# Patient Record
Sex: FEMALE | Race: WHITE | Employment: FULL TIME | URBAN - METROPOLITAN AREA
[De-identification: names, ages, dates, MRNs, and addresses within clinical notes are randomized per-mention and may not be internally consistent; named-entity substitution may affect disease eponyms.]

---

## 2018-06-07 ENCOUNTER — OFFICE VISIT (OUTPATIENT)
Dept: FAMILY MEDICINE CLINIC | Age: 28
End: 2018-06-07

## 2018-06-07 VITALS
BODY MASS INDEX: 27.78 KG/M2 | SYSTOLIC BLOOD PRESSURE: 142 MMHG | DIASTOLIC BLOOD PRESSURE: 98 MMHG | HEIGHT: 67 IN | OXYGEN SATURATION: 99 % | TEMPERATURE: 98 F | RESPIRATION RATE: 18 BRPM | WEIGHT: 177 LBS | HEART RATE: 84 BPM

## 2018-06-07 DIAGNOSIS — R07.89 ATYPICAL CHEST PAIN: Primary | ICD-10-CM

## 2018-06-07 DIAGNOSIS — F41.8 SITUATIONAL ANXIETY: ICD-10-CM

## 2018-06-07 DIAGNOSIS — R03.0 ELEVATED BLOOD PRESSURE READING IN OFFICE WITHOUT DIAGNOSIS OF HYPERTENSION: ICD-10-CM

## 2018-06-07 RX ORDER — DIAZEPAM 5 MG/1
5 TABLET ORAL
Qty: 30 TAB | Refills: 0 | Status: SHIPPED | OUTPATIENT
Start: 2018-06-07 | End: 2021-03-09

## 2018-06-07 NOTE — MR AVS SNAPSHOT
[address] [address] 
[phone] Patient: Bibi Morley MRN: [id number] Visit Information Date & Time Provider Department Dept. Phone Encounter #  
 6/7/2018  1:30 PM [facility] [phone] [id number] Follow-up Instructions Return in about 2 weeks (around 6/21/2018) for follow up. Upcoming Health Maintenance Date Due DTaP/Tdap/Td series (1 - Tdap) 11/5/2011 PAP AKA CERVICAL CYTOLOGY 11/5/2011 Influenza Age 5 to Adult 8/1/2018 Allergies as of 6/7/2018  Review Complete On: 6/7/2018 By: Suhas Card NP Severity Noted Reaction Type Reactions Imitrex [Sumatriptan Succinate]  06/07/2018    Palpitations Current Immunizations  Never Reviewed No immunizations on file. Not reviewed this visit You Were Diagnosed With   
  
 Codes Comments Atypical chest pain    -  Primary ICD-10-CM: R07.89 ICD-9-CM: 786.59 Situational anxiety     ICD-10-CM: F41.8 ICD-9-CM: 300.09 Elevated blood pressure reading in office without diagnosis of hypertension     ICD-10-CM: R03.0 ICD-9-CM: 796.2 Vitals BP Pulse Temp Resp Height(growth percentile) Weight(growth percentile) (!) 142/98 (BP 1 Location: Right arm, BP Patient Position: Sitting) 84 98 °F (36.7 °C) (Oral) 18 5' 7\" (1.702 m) 177 lb (80.3 kg) LMP SpO2 BMI OB Status Smoking Status 05/23/2018 99% 27.72 kg/m2 Having regular periods Current Every Day Smoker Vitals History BMI and BSA Data Body Mass Index Body Surface Area  
 27.72 kg/m 2 1.95 m 2 Preferred Pharmacy Pharmacy Name Phone [facility] [place], [address] [place] [facility] [phone] Your Updated Medication List  
  
   
This list is accurate as of 6/7/18  2:10 PM.  Always use your most recent med list.  
  
  
  
  
 diazePAM 5 mg tablet Commonly known as:  VALIUM Take 1 Tab by mouth daily as needed for Anxiety. Prescriptions Printed Refills  
 diazePAM (VALIUM) 5 mg tablet 0 Sig: Take 1 Tab by mouth daily as needed for Anxiety. Class: Print Route: Oral  
  
Follow-up Instructions Return in about 2 weeks (around 6/21/2018) for follow up. Patient Instructions Benzodiazepines: Potential side effects of benzodiazepine medications include, but are not limited to, the possibility of \"paradoxical agitation\" with irritability, aggressiveness or stimulated behavior; clumsiness, slurring of speech, dulled facies, psychomotor impairment, anterograde amnesia, impaired awareness of degree of drug effect, visual and hearing sensitivity impairment, other psychiatric/behavioral disturbances, impacts operating certain machinery or engaging in certain activities or employment, anxiety, insomnia, anorexia, tremor, nausea, vomiting, diarrhea and potential to develop tolerance, dependence, addiction and death from overdose. Use caution while taking benzodiazepines. There is a potential to overdose on this medication when too many pills are taken at one time. Do not mix alcohol with this medication because it can worsen side effects and be very dangerous. Introducing [facility] & HEALTH SERVICES! [facility] introduces AboutOne patient portal. Now you can access parts of your medical record, email your doctor's office, and request medication refills online. 1. In your internet browser, go to [email or web address] 2. Click on the First Time User? Click Here link in the Sign In box. You will see the New Member Sign Up page. 3. Enter your AboutOne Access Code exactly as it appears below. You will not need to use this code after youve completed the sign-up process. If you do not sign up before the expiration date, you must request a new code. · MedArkive Access Code: [email or web address] Expires: 9/5/2018  2:10 PM 
 
4. Enter the last four digits of your Social Security Number (xxxx) and Date of Birth (mm/dd/yyyy) as indicated and click Submit. You will be taken to the next sign-up page. 5. Create a MedArkive ID. This will be your MedArkive login ID and cannot be changed, so think of one that is secure and easy to remember. 6. Create a MedArkive password. You can change your password at any time. 7. Enter your Password Reset Question and Answer. This can be used at a later time if you forget your password. 8. Enter your e-mail address. You will receive e-mail notification when new information is available in [address] 9. Click Sign Up. You can now view and download portions of your medical record. 10. Click the Download Summary menu link to download a portable copy of your medical information. If you have questions, please visit the Frequently Asked Questions section of the MedArkive website. Remember, MedArkive is NOT to be used for urgent needs. For medical emergencies, dial 911. Now available from your iPhone and Android! Please provide this summary of care documentation to your next provider. If you have any questions after today's visit, please call [phone].

## 2018-06-07 NOTE — PROGRESS NOTES
Chief Complaint   Patient presents with   [address] Chest Pain     Patient in office today to [facility]. Pt in office today for f/u on chest pain that was severe on Sunday. [facility], and ekg were normal.  Pt did f/u with pcp  @ [facility]. Pt was prescribed diclofenac, did not notice an improvement with chest pain. Pt states pain is sporadic, located in left breast can radiates to left arm. Denies injury. Have treated pain with ibuprofen, tylenol, zantac, and tums with no relief. Chest pain comes and goes for the last few years. Usually only lasts for about 30 mins to an hour. This weekend it was more severe so she went to ED. Was \"20 times worse. \" Radiated to the left shoulder and arm. Has had chest xray no chest CT. Chest pain comes and goes, unpredictable. Prior to this weekend had not had pain for a few months. Used to think it was muscular. Noticed more when she washed her hair while showering but does not seem to trigger as often. Denies any associated SOB and dyspnea with CP. Will hold breath if pain is severe. Denies any RUQ abd pain. Reports increased anxiety. Increased work stress on a day to day basis. Pt works in manufacturing. Manages a department. Thinks that she notices the pain when she is more stress. Had a stressful weekend. Was sitting at dinner when the pain started this weekend. Started slow but became extreme in a short amount of time. Pain can be worse with certain movements. Has to sit still to get comfortable. Denies any family history of lung or heart conditions. Had a problem with asthma as a child. Pt smokes approx 2-3 packs a week. Stress smoker. Reports that she is scared to shower due to sx and feeling it could be making things worse. Recalls trying a muscle relaxer which helped. This was a long time ago. BP was 148/90 in the ER. A little elevated today.      Denies any other concerns at this time. Chief Complaint   Patient presents with   [facility].    Chest Pain     she is a 32y.o. year old female who presents for evalution. Reviewed PmHx, RxHx, FmHx, SocHx, AllgHx and updated and dated in the chart. Review of Systems - negative except as listed above in the HPI    Objective:     Vitals:    06/07/18 1336   BP: (!) 142/98   Pulse: 84   Resp: 18   Temp: 98 °F (36.7 °C)   TempSrc: Oral   SpO2: 99%   Weight: 177 lb (80.3 kg)   Height: 5' 7\" (1.702 m)     Physical Examination: General appearance - alert, well appearing, and in no distress  Mental status - normal mood, behavior, speech, dress, motor activity, and thought processes  Eyes - pupils equal and reactive, extraocular eye movements intact  Ears - bilateral TM's and external ear canals normal  Nose - normal and patent, no erythema, discharge or polyps and normal nontender sinuses  Mouth - mucous membranes moist, pharynx normal without lesions  Neck - supple, no significant adenopathy, carotids upstroke normal bilaterally, no bruits, thyroid exam: thyroid is normal in size without nodules or tenderness  Chest - clear to auscultation, no wheezes, rales or rhonchi, symmetric air entry  Heart - normal rate, regular rhythm, normal S1, S2, no murmurs  Extremities - peripheral pulses normal, no ankle edema, no clubbing or cyanosis  Skin - normal coloration and turgor, no rashes, no suspicious skin lesions noted    Assessment/ Plan:   Diagnoses and all orders for this visit:    1. Atypical chest pain / 2. Situational anxiety  -     diazePAM (VALIUM) 5 mg tablet; Take 1 Tab by mouth daily as needed for Anxiety. Discussed that sx and presentation seem consistent with anxiety. Recommended trial of valium daily as needed. Reviewed SEs/ADRs of medication and how to take responsibly. Enc pt to keep a diary of sx and what information to include. Follow up in 2 weeks to review together.    3. Elevated blood pressure reading in office without diagnosis of hypertension  Follow up in 2 weeks to recheck. Discussed that if BP remains elevated, will need to start new daily medication. Will request and review records from [facility]. Follow-up Disposition:  Return in about 2 weeks (around 6/21/2018) for follow up. I have discussed the diagnosis with the patient and the intended plan as seen in the above orders. The patient has received an after-visit summary and questions were answered concerning future plans. Medication Side Effects and Warnings were discussed with patient: yes  Patient Labs were reviewed and or requested: yes  Patient Past Records were reviewed and or requested  yes  Patient / Caregiver Understanding of treatment plan was verbalized during office visit YES    HAYDEN Paul    Patient Instructions   Benzodiazepines: Potential side effects of benzodiazepine medications include, but are not limited to, the possibility of \"paradoxical agitation\" with irritability, aggressiveness or stimulated behavior; clumsiness, slurring of speech, dulled facies, psychomotor impairment, anterograde amnesia, impaired awareness of degree of drug effect, visual and hearing sensitivity impairment, other psychiatric/behavioral disturbances, impacts operating certain machinery or engaging in certain activities or employment, anxiety, insomnia, anorexia, tremor, nausea, vomiting, diarrhea and potential to develop tolerance, dependence, addiction and death from overdose. Use caution while taking benzodiazepines. There is a potential to overdose on this medication when too many pills are taken at one time. Do not mix alcohol with this medication because it can worsen side effects and be very dangerous.

## 2018-06-07 NOTE — PROGRESS NOTES
Chief Complaint   Patient presents with   [address] Chest Pain     Patient in office today to est care. Pt in office today for f/u on chest pain that was severe on Sunday. Pt was see at [facility], labs, xray, and ekg was normal.  Pt did f/u with pcp  @ [facility]. Pt was prescribed diclofenac, did not notice an improvement with chest pain. Pt states pain is sporadic, located in left breast can radiates to left arm. Denies injury. Have treated pain with ibuprofen,tylenol,zantac, and tums with no relief. 1. Have you been to the ER, urgent care clinic since your last visit? Hospitalized since your last visit? No    2. Have you seen or consulted any other health care providers outside of the [address] [place] [address] since your last visit? Include any pap smears or colon screening.  No

## 2018-06-21 ENCOUNTER — OFFICE VISIT (OUTPATIENT)
Dept: FAMILY MEDICINE CLINIC | Age: 28
End: 2018-06-21

## 2018-06-21 VITALS
RESPIRATION RATE: 17 BRPM | OXYGEN SATURATION: 98 % | DIASTOLIC BLOOD PRESSURE: 91 MMHG | BODY MASS INDEX: 28.09 KG/M2 | SYSTOLIC BLOOD PRESSURE: 135 MMHG | WEIGHT: 179 LBS | HEIGHT: 67 IN | TEMPERATURE: 98.2 F | HEART RATE: 82 BPM

## 2018-06-21 DIAGNOSIS — R03.0 ELEVATED BLOOD PRESSURE READING: ICD-10-CM

## 2018-06-21 DIAGNOSIS — R07.9 CHEST PAIN, UNSPECIFIED TYPE: Primary | ICD-10-CM

## 2018-06-21 DIAGNOSIS — R20.2 PARESTHESIA OF LEFT ARM: ICD-10-CM

## 2018-06-21 RX ORDER — DICLOFENAC SODIUM 75 MG/1
75 TABLET, DELAYED RELEASE ORAL
Qty: 30 TAB | Refills: 0 | Status: SHIPPED | OUTPATIENT
Start: 2018-06-21 | End: 2021-03-09

## 2018-06-21 NOTE — MR AVS SNAPSHOT
[address] [address] 
[phone] Patient: India Judge MRN: [id number] Visit Information Date & Time Provider Department Dept. Phone Encounter #  
 6/21/2018  3:30 PM Sarah Lawrence NP [address] [facility] [phone] [id number] Follow-up Instructions Return if symptoms worsen or fail to improve. Upcoming Health Maintenance Date Due Pneumococcal 19-64 Medium Risk (1 of 1 - PPSV23) 11/5/2009 DTaP/Tdap/Td series (1 - Tdap) 11/5/2011 PAP AKA CERVICAL CYTOLOGY 11/5/2011 Influenza Age 5 to Adult 8/1/2018 Allergies as of 6/21/2018  Review Complete On: 6/21/2018 By: Susan Whalen LPN Severity Noted Reaction Type Reactions Imitrex [Sumatriptan Succinate]  06/07/2018    Palpitations Imitrex [Sumatriptan]  01/22/2011    Palpitations Current Immunizations  Never Reviewed No immunizations on file. Not reviewed this visit You Were Diagnosed With   
  
 Codes Comments Chest pain, unspecified type    -  Primary ICD-10-CM: R07.9 ICD-9-CM: 786.50 Paresthesia of left arm     ICD-10-CM: R20.2 ICD-9-CM: 782.0 Elevated blood pressure reading     ICD-10-CM: R03.0 ICD-9-CM: 796.2 Vitals BP Pulse Temp Resp Height(growth percentile) Weight(growth percentile) (!) 135/91 (BP 1 Location: Left arm, BP Patient Position: Sitting) 82 98.2 °F (36.8 °C) (Oral) 17 5' 7\" (1.702 m) 179 lb (81.2 kg) LMP SpO2 BMI OB Status Smoking Status 05/23/2018 98% 28.04 kg/m2 Having regular periods Current Every Day Smoker Vitals History BMI and BSA Data Body Mass Index Body Surface Area 28.04 kg/m 2 1.96 m 2 Preferred Pharmacy Pharmacy Name Phone [facility] [place], [address] [place] AT [facility] [phone] Your Updated Medication List  
  
   
 This list is accurate as of 6/21/18  3:47 PM.  Always use your most recent med list.  
  
  
  
  
 diazePAM 5 mg tablet Commonly known as:  VALIUM Take 1 Tab by mouth daily as needed for Anxiety. diclofenac EC 75 mg EC tablet Commonly known as:  VOLTAREN Take 1 Tab by mouth two (2) times daily (after meals). VICODIN 5-500 mg per tablet Generic drug:  HYDROcodone-acetaminophen Take 1 Tab by mouth every four (4) hours as needed. Prescriptions Sent to Pharmacy Refills  
 diclofenac EC (VOLTAREN) 75 mg EC tablet 0 Sig: Take 1 Tab by mouth two (2) times daily (after meals). Class: Normal  
 Pharmacy: [facility] [address] [facility] #: [phone] Route: Oral  
  
We Performed the Following REFERRAL TO CARDIOLOGY [VXV56 Custom] Follow-up Instructions Return if symptoms worsen or fail to improve. Referral Information Referral ID Referred By Referred To  
  
 [id number] Zain ARCE MD   
   [address] [place] [address] Phone: [phone] Fax: [phone] Visits Status Start Date End Date 1 New Request 6/21/18 6/21/19 If your referral has a status of pending review or denied, additional information will be sent to support the outcome of this decision. Patient Instructions Chest Pain: Care Instructions Your Care Instructions There are many things that can cause chest pain. Some are not serious and will get better on their own in a few days. But some kinds of chest pain need more testing and treatment. Your doctor may have recommended a follow-up visit in the next 8 to 12 hours. If you are not getting better, you may need more tests or treatment. Even though your doctor has released you, you still need to watch for any problems.  The doctor carefully checked you, but sometimes problems can develop later. If you have new symptoms or if your symptoms do not get better, get medical care right away. If you have worse or different chest pain or pressure that lasts more than 5 minutes or you passed out (lost consciousness), call 911 or seek other emergency help right away. A medical visit is only one step in your treatment. Even if you feel better, you still need to do what your doctor recommends, such as going to all suggested follow-up appointments and taking medicines exactly as directed. This will help you recover and help prevent future problems. How can you care for yourself at home? · Rest until you feel better. · Take your medicine exactly as prescribed. Call your doctor if you think you are having a problem with your medicine. · Do not drive after taking a prescription pain medicine. When should you call for help? Call 911 if: 
? · You passed out (lost consciousness). ? · You have severe difficulty breathing. ? · You have symptoms of a heart attack. These may include: ¨ Chest pain or pressure, or a strange feeling in your chest. 
¨ Sweating. ¨ Shortness of breath. ¨ Nausea or vomiting. ¨ Pain, pressure, or a strange feeling in your back, neck, jaw, or upper belly or in one or both shoulders or arms. ¨ Lightheadedness or sudden weakness. ¨ A fast or irregular heartbeat. After you call 911, the  may tell you to chew 1 adult-strength or 2 to 4 low-dose aspirin. Wait for an ambulance. Do not try to drive yourself. ?Call your doctor today if: 
? · You have any trouble breathing. ? · Your chest pain gets worse. ? · You are dizzy or lightheaded, or you feel like you may faint. ? · You are not getting better as expected. ? · You are having new or different chest pain. Where can you learn more? Go to http://johan-justine.info/. Enter A120 in the search box to learn more about \"Chest Pain: Care Instructions. \" Current as of: March 20, 2017 Content Version: 11.4 © 1113-5291 Healthwise, [employer]. Care instructions adapted under license by [employer] (which disclaims liability or warranty for this information). If you have questions about a medical condition or this instruction, always ask your healthcare professional. [address] any warranty or liability for your use of this information. Introducing hospitals & HEALTH SERVICES! [facility] introduces NeuroLogica patient portal. Now you can access parts of your medical record, email your doctor's office, and request medication refills online. 1. In your internet browser, go to [email or web address] 2. Click on the First Time User? Click Here link in the Sign In box. You will see the New Member Sign Up page. 3. Enter your NeuroLogica Access Code exactly as it appears below. You will not need to use this code after youve completed the sign-up process. If you do not sign up before the expiration date, you must request a new code. · NeuroLogica Access Code: [email or web address] Expires: 9/5/2018  2:10 PM 
 
4. Enter the last four digits of your Social Security Number (xxxx) and Date of Birth (mm/dd/yyyy) as indicated and click Submit. You will be taken to the next sign-up page. 5. Create a NeuroLogica ID. This will be your NeuroLogica login ID and cannot be changed, so think of one that is secure and easy to remember. 6. Create a NeuroLogica password. You can change your password at any time. 7. Enter your Password Reset Question and Answer. This can be used at a later time if you forget your password. 8. Enter your e-mail address. You will receive e-mail notification when new information is available in [address] 9. Click Sign Up. You can now view and download portions of your medical record. 10. Click the Download Summary menu link to download a portable copy of your medical information. If you have questions, please visit the Frequently Asked Questions section of the [employer] website. Remember, GetOutfitted is NOT to be used for urgent needs. For medical emergencies, dial 911. Now available from your iPhone and Android! Please provide this summary of care documentation to your next provider. Your primary care clinician is listed as Alia Lawler. If you have any questions after today's visit, please call [phone].

## 2018-06-21 NOTE — PATIENT INSTRUCTIONS
Chest Pain: Care Instructions  Your Care Instructions    There are many things that can cause chest pain. Some are not serious and will get better on their own in a few days. But some kinds of chest pain need more testing and treatment. Your doctor may have recommended a follow-up visit in the next 8 to 12 hours. If you are not getting better, you may need more tests or treatment. Even though your doctor has released you, you still need to watch for any problems. The doctor carefully checked you, but sometimes problems can develop later. If you have new symptoms or if your symptoms do not get better, get medical care right away. If you have worse or different chest pain or pressure that lasts more than 5 minutes or you passed out (lost consciousness), call 911 or seek other emergency help right away. A medical visit is only one step in your treatment. Even if you feel better, you still need to do what your doctor recommends, such as going to all suggested follow-up appointments and taking medicines exactly as directed. This will help you recover and help prevent future problems. How can you care for yourself at home? · Rest until you feel better. · Take your medicine exactly as prescribed. Call your doctor if you think you are having a problem with your medicine. · Do not drive after taking a prescription pain medicine. When should you call for help? Call 911 if:  ? · You passed out (lost consciousness). ? · You have severe difficulty breathing. ? · You have symptoms of a heart attack. These may include:  ¨ Chest pain or pressure, or a strange feeling in your chest.  ¨ Sweating. ¨ Shortness of breath. ¨ Nausea or vomiting. ¨ Pain, pressure, or a strange feeling in your back, neck, jaw, or upper belly or in one or both shoulders or arms. ¨ Lightheadedness or sudden weakness. ¨ A fast or irregular heartbeat.   After you call 911, the  may tell you to chew 1 adult-strength or 2 to 4 low-dose aspirin. Wait for an ambulance. Do not try to drive yourself. ?Call your doctor today if:  ? · You have any trouble breathing. ? · Your chest pain gets worse. ? · You are dizzy or lightheaded, or you feel like you may faint. ? · You are not getting better as expected. ? · You are having new or different chest pain. Where can you learn more? Go to http://johan-justine.info/. Enter A120 in the search box to learn more about \"Chest Pain: Care Instructions. \"  Current as of: March 20, 2017  Content Version: 11.4  © 5999-1488 [employer]. Care instructions adapted under license by [employer] (which disclaims liability or warranty for this information). If you have questions about a medical condition or this instruction, always ask your healthcare professional. [address] any warranty or liability for your use of this information.

## 2018-06-21 NOTE — PROGRESS NOTES
Chief Complaint   Patient presents with    Follow-up     Last OV on 6/7/19 for chest pain    Arm Pain     Left    Numbness     Left     she is a 32y.o. year old female who presents for evalution. Pt here for chest pain F/U. Has been seeing Melissa Carlin and was given Valium at last appointment. Pt has taken Valium when had minor episodes of chest pain - helped with severity and length of symptoms. Pt has not had major chest pain event yet. Pt states has been having these chest pain for past few years, is interested in seeing Cardio if possible. Pt states when went to ER was having left arm pain. At first thought was just from IV and other things. Since then has been continuously aching. Went to [employer] over weekend and worsened and now started having numbness. Reviewed PmHx, RxHx, FmHx, SocHx, AllgHx and updated and dated in the chart. Review of Systems - negative except as listed above in the HPI    Objective:     Vitals:    06/21/18 1528   BP: (!) 135/91   Pulse: 82   Resp: 17   Temp: 98.2 °F (36.8 °C)   TempSrc: Oral   SpO2: 98%   Weight: 179 lb (81.2 kg)   Height: 5' 7\" (1.702 m)     Physical Examination: General appearance - alert, well appearing, and in no distress  Chest - clear to auscultation, no wheezes, rales or rhonchi, symmetric air entry  Heart - normal rate, regular rhythm, normal S1, S2, no murmurs, rubs, clicks or gallops  Musculoskeletal - abnormal exam of left shoulder  Mild crepitus, movements painful, no tenderness, swelling or deformity     Assessment/ Plan:   Diagnoses and all orders for this visit:    1. Chest pain, unspecified type  -     [facility]  Likely secondary to anxiety but pt would like to see specialist since has been on-going for past 2 years. 2. Paresthesia of left arm  -     diclofenac EC (VOLTAREN) 75 mg EC tablet; Take 1 Tab by mouth two (2) times daily (after meals). New rx. Activity modification, gentle stretching.      3. Elevated blood pressure reading  Pt will obtain monitor and start checking at home when wakes up. F/U if readings persistently >130/90. Pt voiced understanding regarding plan of care. Follow-up Disposition:  Return if symptoms worsen or fail to improve. I have discussed the diagnosis with the patient and the intended plan as seen in the above orders. The patient has received an after-visit summary and questions were answered concerning future plans.      Medication Side Effects and Warnings were discussed with patient    Preet Musa NP

## 2018-07-09 ENCOUNTER — TELEPHONE (OUTPATIENT)
Dept: FAMILY MEDICINE CLINIC | Age: 28
End: 2018-07-09

## 2018-07-09 RX ORDER — PROPRANOLOL HYDROCHLORIDE 10 MG/1
10 TABLET ORAL
Qty: 90 TAB | Refills: 0 | Status: SHIPPED | OUTPATIENT
Start: 2018-07-09 | End: 2021-03-09

## 2018-07-09 NOTE — TELEPHONE ENCOUNTER
Pt calling and wants to speak with nurse about pains in chest that are back. Please call her back at [phone].

## 2018-07-09 NOTE — TELEPHONE ENCOUNTER
Eliceo called. Pt was still having chest pain even after taking Voltaren. Call transferred to [place].

## 2018-07-09 NOTE — TELEPHONE ENCOUNTER
Spoke with pt on phone. Actively having dizziness and chest pain starting around noon. Took Valium and pain eased up, however pain still present and then started worsening again but in different way. Also having racing heart - around 120. Pt states when tries to stand up or move pain in chest will worsen - feel like getting stabbed in back and be unable to move. Recommended pt restart Diclofenac for a few days to help with stabbing sensation. Will send in rx to help with heart rate control. No new or concerning red flag symptoms so at this time do not feel like pt needs to go back to ER - just had cardiac work-up done less than a month ago. However, if symptoms change and pt feels in danger then recommended going to ER. Pt voiced understanding. States has appt with Cardio in 1 wk.

## 2018-07-11 RX ORDER — PHENOL/SODIUM PHENOLATE
20 AEROSOL, SPRAY (ML) MUCOUS MEMBRANE DAILY
Qty: 30 TAB | Refills: 0 | Status: SHIPPED | OUTPATIENT
Start: 2018-07-11 | End: 2018-08-06 | Stop reason: SDUPTHER

## 2018-07-11 RX ORDER — PREDNISONE 20 MG/1
20 TABLET ORAL 3 TIMES DAILY
Qty: 15 TAB | Refills: 0 | Status: SHIPPED | OUTPATIENT
Start: 2018-07-11 | End: 2018-07-16

## 2018-07-11 RX ORDER — CYCLOBENZAPRINE HCL 10 MG
10 TABLET ORAL
Qty: 30 TAB | Refills: 0 | Status: SHIPPED | OUTPATIENT
Start: 2018-07-11 | End: 2021-03-09

## 2018-07-11 NOTE — TELEPHONE ENCOUNTER
Called pt, ID x2. Pt stated that her chest pain is worst and her HR is in the 90s. Please call her back.

## 2018-07-11 NOTE — TELEPHONE ENCOUNTER
Upon further speaking with pt pain happened after meal last night of lasanga - could be GERD related. Will also send in PPI.

## 2018-07-11 NOTE — TELEPHONE ENCOUNTER
Pt states since last night and today has had pain in chest radiating into neck, unable to sleep. States Diclofenac is not helping at all, is taking propranolol which is helping with rate control. Pt states pain is severe, hard to move or go to work. Pt does have Cardio appt on Monday with Dr. Emiliana Sotomayor. Discussed with patient could be musculoskeletal.  Will send in rx for Prednisone and muscle relaxer.

## 2018-07-11 NOTE — TELEPHONE ENCOUNTER
Pt calling and states was told by Sai Gonzales the other day if after taking the medication that was called in and no relief of symptoms to call office back. Please call her today asap at [phone].

## 2018-07-11 NOTE — TELEPHONE ENCOUNTER
Unsure what else we can give to pt to help with symptoms at this time. Recommend continuing to take Diclofenac BID after meals and propranolol TID. What is heart rate running? Should be improved from 120 on Propranolol.    Thanks,  N

## 2018-07-16 ENCOUNTER — OFFICE VISIT (OUTPATIENT)
Dept: CARDIOLOGY CLINIC | Age: 28
End: 2018-07-16

## 2018-07-16 VITALS
OXYGEN SATURATION: 98 % | RESPIRATION RATE: 18 BRPM | HEIGHT: 67 IN | SYSTOLIC BLOOD PRESSURE: 140 MMHG | DIASTOLIC BLOOD PRESSURE: 90 MMHG | HEART RATE: 93 BPM | WEIGHT: 176.6 LBS | BODY MASS INDEX: 27.72 KG/M2

## 2018-07-16 DIAGNOSIS — R07.9 CHEST PAIN, UNSPECIFIED TYPE: Primary | ICD-10-CM

## 2018-07-16 DIAGNOSIS — I10 HYPERTENSION, UNSPECIFIED TYPE: ICD-10-CM

## 2018-07-16 DIAGNOSIS — I20.0 UNSTABLE ANGINA PECTORIS (HCC): ICD-10-CM

## 2018-07-16 NOTE — PROGRESS NOTES
Angelito Stubbs MD. [facility] - Echo Patient: Claudeen Kanner : 1990 Today's Date: 2018 HISTORY OF PRESENT ILLNESS:  
 
History of Present Illness: Ms. Ki Washburn has had chronic chest pain (a couple of years) and wanted to see cardiology to rule out heart disease. She feels symptoms getting worse. Was in ER last month and workup there was OK ([facility]). She has pain usually when she is relaxed - it is sharp - worse leaning forward or moving to the left. More frequent recently. Pain is not exertional.  Pain can last from one hour to an entire day. PAST MEDICAL HISTORY:  
 
Past Medical History:  
Diagnosis Date  Asthma   
 Heart murmur  Raynaud disease  UTI (lower urinary tract infection) Past Surgical History:  
Procedure Laterality Date  HX ORTHOPAEDIC    
 realignment on left knee  HX WISDOM TEETH EXTRACTION    
 REVISION OF UNSTABLE PATELLA RIGHT  
 
 
 
 
MEDICATIONS:  
 
Current Outpatient Prescriptions Medication Sig Dispense Refill  predniSONE (DELTASONE) 20 mg tablet Take 1 Tab by mouth three (3) times daily for 5 days. 15 Tab 0  cyclobenzaprine (FLEXERIL) 10 mg tablet Take 1 Tab by mouth three (3) times daily as needed for Muscle Spasm(s). 30 Tab 0  
 Omeprazole delayed release (PRILOSEC D/R) 20 mg tablet Take 1 Tab by mouth daily. Take in AM with water, wait 30-60 min before eating or drinking 30 Tab 0  propranolol (INDERAL) 10 mg tablet Take 1 Tab by mouth three (3) times daily as needed. 90 Tab 0  
 diclofenac EC (VOLTAREN) 75 mg EC tablet Take 1 Tab by mouth two (2) times daily (after meals). 30 Tab 0  
 diazePAM (VALIUM) 5 mg tablet Take 1 Tab by mouth daily as needed for Anxiety. 30 Tab 0  
 hydrocodone-acetaminophen (VICODIN) 5-500 mg per tablet Take 1 Tab by mouth every four (4) hours as needed. Allergies Allergen Reactions  Imitrex [Sumatriptan Succinate] Palpitations  Imitrex [Sumatriptan] Palpitations SOCIAL HISTORY:  
 
Social History Substance Use Topics  Smoking status: Current Every Day Smoker Packs/day: 0.25  Smokeless tobacco: Never Used  Alcohol use 3.0 oz/week  
  5 Glasses of wine per week FAMILY HISTORY:  
 
Family History Problem Relation Age of Onset  No Known Problems Mother  No Known Problems Father REVIEW OF SYMPTOMS:  
 
Review of Symptoms: 
Constitutional: Negative for fever, chills HEENT: Negative for nosebleeds, tinnitus, and vision changes. Respiratory: Negative for cough, wheezing Cardiovascular: Negative for orthopnea, claudication, syncope, and PND. Gastrointestinal: Negative for abdominal pain, diarrhea, melena. Genitourinary: Negative for dysuria Musculoskeletal: Negative for myalgias. Skin: Negative for rash Heme: No problems bleeding. Neurological: Negative for speech change and focal weakness. PHYSICAL EXAM:  
 
Physical Exam: 
Visit Vitals  /90 (BP 1 Location: Left arm, BP Patient Position: Sitting)  Pulse 93  Resp 18  Ht 5' 7\" (1.702 m)  Wt 176 lb 9.6 oz (80.1 kg)  SpO2 98%  BMI 27.66 kg/m2 Patient appears generally well, mood and affect are appropriate and pleasant. HEENT:  Hearing intact, non-icteric, normocephalic, atraumatic. Neck Exam: Supple, No JVD or carotid bruits. Lung Exam: Clear to auscultation, even breath sounds. Cardiac Exam: Regular rate and rhythm with no murmur Abdomen: Soft, non-tender, normal bowel sounds. No bruits or masses. Extremities: Moves all ext well. No lower extremity edema. Vascular: 2+ dorsalis pedis pulses bilaterally. Psych: Appropriate affect Neuro - Grossly intact LABS / OTHER STUDIES:  
 
 
Have requested [facility] records to review. CARDIAC DIAGNOSTICS:  
 
Cardiac Evaluation Includes: 
EKG 7/16/18 - NSR, normal  
 
 
 
 
ASSESSMENT AND PLAN:  
 
Assessment and Plan: 
1) Non-anginal chest pain - symptoms have been non-anginal.   
- Symptoms sound like msktl in nature. - In order to rule out heart disease, will check a treadmill stress test and an echo - Will get recent records from [address] [place] [address] to review. - If cardiac workup is OK, hopefully this will provide some reassurance. Would focus on chest wall pain and anxiety to help improve symptoms. 2) Smoking cessation advised. Patient says her prior lipid levels have looked good. 3) BP high today. Needs to follow healthy lifestyle and follow as outpatient. 4) Phone FU after testing. See me back as needed. Patient expressed understanding of the plan - questions were answered. Engaged to be  in November 2018. Priyanka Ramos MD, [facility] [place] [facility] [address] 566 [facility], [address]  [facility] [address] Ph: [phone]   Ph [phone] ADDENDUM   7/25/2018 Echo 7/25/18 - LVEF 65% Treadmill Stress test 7/25/18 - walked 9 min (10.1 METS), normal study BP was OK with the stress test.  
 
Will have nurse call with normal stress test and echo results. CP likely non-cardiac. See me back as needed.

## 2018-07-16 NOTE — PROGRESS NOTES
All health maintenance and other pertinent information has been reviewed in preparation for today's office visit. Patient presents in the office today for:    Chief Complaint   Patient presents with    New Patient     Pt c/o sharp pain in the middle of chest with pain radiating down left arm. States it last on and off for the past month. Pt states she also gets lightheaded and dizzy when the chest pains occur     1. Have you been to the ER, urgent care clinic since your last visit? Hospitalized since your last visit? No    2. Have you seen or consulted any other health care providers outside of the [address] [place] [address] since your last visit? Include any pap smears or colon screening.  No

## 2018-07-16 NOTE — MR AVS SNAPSHOT
[address] [address] 
[phone] Patient: Tamera Shannon MRN: [id number] [id number] Visit Information Date & Time Provider Department Dept. Phone Encounter #  
 7/16/2018  1:40 PM Sofia Betancur MD [facility] [phone] [id number] Your Appointments 7/25/2018  9:00 AM  
ECHO CARDIOGRAMS 2D with ECHO, Tanner Lopez [facility] ([facility] SCHEDULING) Appt Note: per dr Lizzie watson cp reg stress test at [address] [facility] [address] [address] [facility] [address]  
  
   
 [id number] [place] [id number]  
  
    
 7/25/2018 10:00 AM  
STRESS ECHOCARDIOGRAMS with ECHO, ISAI [facility] ([facility] SCHEDULING) Appt Note: reg stress test dr Lizzie watson cp  
 [address] [address] [phone]  
  
   
 [address] [address] Upcoming Health Maintenance Date Due Pneumococcal 19-64 Medium Risk (1 of 1 - PPSV23) 11/5/2009 DTaP/Tdap/Td series (1 - Tdap) 11/5/2011 PAP AKA CERVICAL CYTOLOGY 11/5/2011 Influenza Age 5 to Adult 8/1/2018 Allergies as of 7/16/2018  Review Complete On: 7/16/2018 By: Alen Ferguson LPN Severity Noted Reaction Type Reactions Imitrex [Sumatriptan Succinate]  06/07/2018    Palpitations Imitrex [Sumatriptan]  01/22/2011    Palpitations Current Immunizations  Never Reviewed No immunizations on file. Not reviewed this visit You Were Diagnosed With   
  
 Codes Comments Unstable angina pectoris ([facility] [address]    -  Primary ICD-10-CM: I20.0 ICD-9-CM: 411.1 Vitals BP Pulse Resp Height(growth percentile) Weight(growth percentile) SpO2  
 140/90 (BP 1 Location: Left arm, BP Patient Position: Sitting) 93 18 5' 7\" (1.702 m) 176 lb 9.6 oz (80.1 kg) 98% BMI OB Status Smoking Status 27.66 kg/m2 Having regular periods Current Every Day Smoker Vitals History BMI and BSA Data Body Mass Index Body Surface Area  
 27.66 kg/m 2 1.95 m 2 Preferred Pharmacy Pharmacy Name Phone [facility] [place], [address] [place] AT [facility] [phone] Your Updated Medication List  
  
   
This list is accurate as of 7/16/18  1:49 PM.  Always use your most recent med list.  
  
  
  
  
 cyclobenzaprine 10 mg tablet Commonly known as:  FLEXERIL Take 1 Tab by mouth three (3) times daily as needed for Muscle Spasm(s). diazePAM 5 mg tablet Commonly known as:  VALIUM Take 1 Tab by mouth daily as needed for Anxiety. diclofenac EC 75 mg EC tablet Commonly known as:  VOLTAREN Take 1 Tab by mouth two (2) times daily (after meals). Omeprazole delayed release 20 mg tablet Commonly known as:  PRILOSEC D/R Take 1 Tab by mouth daily. Take in AM with water, wait 30-60 min before eating or drinking  
  
 predniSONE 20 mg tablet Commonly known as:  Daniel Dally Take 1 Tab by mouth three (3) times daily for 5 days. propranolol 10 mg tablet Commonly known as:  INDERAL Take 1 Tab by mouth three (3) times daily as needed. VICODIN 5-500 mg per tablet Generic drug:  HYDROcodone-acetaminophen Take 1 Tab by mouth every four (4) hours as needed. We Performed the Following AMB POC EKG ROUTINE W/ 12 LEADS, INTER & REP [00832 CPT(R)] Introducing [facility]! Romayne Duster introduces [employer] patient portal. Now you can access parts of your medical record, email your doctor's office, and request medication refills online. 1. In your internet browser, go to [email or web address] 2. Click on the First Time User? Click Here link in the Sign In box. You will see the New Member Sign Up page. 3. Enter your [employer] Access Code exactly as it appears below.  You will not need to use this code after youve completed the sign-up process. If you do not sign up before the expiration date, you must request a new code. · [employer] Access Code: [email or web address] Expires: 9/5/2018  2:10 PM 
 
4. Enter the last four digits of your Social Security Number (xxxx) and Date of Birth (mm/dd/yyyy) as indicated and click Submit. You will be taken to the next sign-up page. 5. Create a [employer] ID. This will be your [employer] login ID and cannot be changed, so think of one that is secure and easy to remember. 6. Create a [employer] password. You can change your password at any time. 7. Enter your Password Reset Question and Answer. This can be used at a later time if you forget your password. 8. Enter your e-mail address. You will receive e-mail notification when new information is available in [address]. 9. Click Sign Up. You can now view and download portions of your medical record. 10. Click the Download Summary menu link to download a portable copy of your medical information. If you have questions, please visit the Frequently Asked Questions section of the [employer] website. Remember, [employer] is NOT to be used for urgent needs. For medical emergencies, dial 911. Now available from your iPhone and Android! Please provide this summary of care documentation to your next provider. Your primary care clinician is listed as Renea Pacheco. If you have any questions after today's visit, please call [phone].

## 2018-07-25 ENCOUNTER — CLINICAL SUPPORT (OUTPATIENT)
Dept: CARDIOLOGY CLINIC | Age: 28
End: 2018-07-25

## 2018-07-25 DIAGNOSIS — R07.9 CHEST PAIN, UNSPECIFIED TYPE: Primary | ICD-10-CM

## 2018-07-27 ENCOUNTER — TELEPHONE (OUTPATIENT)
Dept: CARDIOLOGY CLINIC | Age: 28
End: 2018-07-27

## 2018-07-27 NOTE — TELEPHONE ENCOUNTER
----- Message from Wilbur García MD sent at 7/25/2018  9:46 PM EDT -----  Regarding: please call patient  Please call patient. Echo 7/25/18 - LVEF 65%  Treadmill Stress test 7/25/18 - walked 9 min (10.1 METS), normal study     BP was OK with the stress test.     Will have nurse call with normal stress test and echo results. CP likely non-cardiac. See me back as needed.      Thanks,  SK

## 2021-03-09 ENCOUNTER — HOSPITAL ENCOUNTER (EMERGENCY)
Age: 31
Discharge: HOME | End: 2021-03-10
Attending: EMERGENCY MEDICINE
Payer: COMMERCIAL

## 2021-03-09 DIAGNOSIS — S01.81XA FACIAL LACERATION, INITIAL ENCOUNTER: ICD-10-CM
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
## 2021-03-09 PROCEDURE — 74011000250 HC RX REV CODE- 250: Performed by: EMERGENCY MEDICINE

## 2021-03-09 PROCEDURE — 99284 EMERGENCY DEPT VISIT MOD MDM: CPT

## 2021-03-09 PROCEDURE — 90471 IMMUNIZATION ADMIN: CPT

## 2021-03-09 RX ORDER — LIDOCAINE HYDROCHLORIDE AND EPINEPHRINE 10; 10 MG/ML; UG/ML
1.5 INJECTION, SOLUTION INFILTRATION; PERINEURAL ONCE
Status: COMPLETED | OUTPATIENT
Start: 2021-03-09 | End: 2021-03-09

## 2021-03-09 RX ORDER — ESCITALOPRAM OXALATE 10 MG/1
10 TABLET ORAL DAILY
COMMUNITY

## 2021-03-09 RX ADMIN — LIDOCAINE HYDROCHLORIDE,EPINEPHRINE BITARTRATE 15 MG: 10; .01 INJECTION, SOLUTION INFILTRATION; PERINEURAL at 23:59

## 2021-03-10 VITALS
HEART RATE: 120 BPM | RESPIRATION RATE: 16 BRPM | SYSTOLIC BLOOD PRESSURE: 136 MMHG | TEMPERATURE: 96.9 F | WEIGHT: 175.27 LBS | BODY MASS INDEX: 27.45 KG/M2 | OXYGEN SATURATION: 98 % | DIASTOLIC BLOOD PRESSURE: 87 MMHG

## 2021-03-10 PROCEDURE — 90471 IMMUNIZATION ADMIN: CPT

## 2021-03-10 PROCEDURE — 74011250636 HC RX REV CODE- 250/636: Performed by: EMERGENCY MEDICINE

## 2021-03-10 PROCEDURE — 74011000250 HC RX REV CODE- 250: Performed by: EMERGENCY MEDICINE

## 2021-03-10 PROCEDURE — 75810000293 HC SIMP/SUPERF WND  RPR

## 2021-03-10 PROCEDURE — 90715 TDAP VACCINE 7 YRS/> IM: CPT | Performed by: EMERGENCY MEDICINE

## 2021-03-10 RX ORDER — AMOXICILLIN AND CLAVULANATE POTASSIUM 875; 125 MG/1; MG/1
1 TABLET, FILM COATED ORAL 2 TIMES DAILY
Qty: 10 TAB | Refills: 0 | Status: SHIPPED | OUTPATIENT
Start: 2021-03-10 | End: 2021-03-15

## 2021-03-10 RX ADMIN — BACITRACIN, NEOMYCIN, POLYMYXIN B 1 PACKET: 400; 3.5; 5 OINTMENT TOPICAL at 00:00

## 2021-03-10 RX ADMIN — TETANUS TOXOID, REDUCED DIPHTHERIA TOXOID AND ACELLULAR PERTUSSIS VACCINE, ADSORBED 0.5 ML: 5; 2.5; 8; 8; 2.5 SUSPENSION INTRAMUSCULAR at 00:31

## 2021-03-10 NOTE — ED PROVIDER NOTES
History of depression. She presents accompanied by her  after sustaining a dog bite approximately 2 and half hours ago. She states that her dog snapped at her and bit above her upper lip near the midline. The wound has continued to ooze blood. It is not through and through. She has a couple of other small wounds that barely broke the surface. She states that the dog's rabies shots are up-to-date. Her tetanus shot is up-to-date. Past Medical History:   Diagnosis Date    Asthma     Heart murmur     Raynaud disease     UTI (lower urinary tract infection)        Past Surgical History:   Procedure Laterality Date    HX ORTHOPAEDIC      realignment on left knee    HX WISDOM TEETH EXTRACTION      [place] REVISION OF UNSTABLE PATELLA      RIGHT         Family History:   Problem Relation Age of Onset    No Known Problems Mother     No Known Problems Father        Social History     Socioeconomic History    Marital status: SINGLE     Spouse name: Not on file    Number of children: Not on file    Years of education: Not on file    Highest education level: Not on file   Occupational History    Not on file   Social Needs    Financial resource strain: Not on file    Food insecurity     Worry: Not on file     Inability: Not on file    Transportation needs     Medical: Not on file     Non-medical: Not on file   Tobacco Use    Smoking status: Current Every Day Smoker     Packs/day: 0.25    Smokeless tobacco: Never Used   Substance and Sexual Activity    Alcohol use:  Yes     Alcohol/week: 5.0 standard drinks     Types: 5 Glasses of wine per week    Drug use: No    Sexual activity: Yes   Lifestyle    Physical activity     Days per week: Not on file     Minutes per session: Not on file    Stress: Not on file   Relationships    Social connections     Talks on phone: Not on file     Gets together: Not on file     Attends Orthodox service: Not on file     Active member of club or organization: Not on file     Attends meetings of clubs or organizations: Not on file     Relationship status: Not on file    Intimate partner violence     Fear of current or ex partner: Not on file     Emotionally abused: Not on file     Physically abused: Not on file     Forced sexual activity: Not on file   Other Topics Concern    Not on file   Social History Narrative    ** Merged History Encounter **              ALLERGIES: Imitrex [sumatriptan succinate] and Imitrex [sumatriptan]    Review of Systems   All other systems reviewed and are negative. There were no vitals filed for this visit. Physical Exam  Vitals signs and nursing note reviewed. Constitutional:       Appearance: She is well-developed. HENT:      Head: Normocephalic. Comments: 0.5 cm laceration above her upper lip near the midline. Mild oozing of blood noted. Not through and through. Eyes:      Conjunctiva/sclera: Conjunctivae normal.   Neck:      Trachea: No tracheal deviation. Cardiovascular:      Rate and Rhythm: Normal rate. Pulmonary:      Effort: Pulmonary effort is normal.   Abdominal:      General: There is no distension. Skin:     General: Skin is dry. Neurological:      Mental Status: She is alert. MDM       Wound Repair    Date/Time: 3/10/2021 12:15 AM  Performed by: attendingPreparation: skin prepped with Betadine and sterile field established  Pre-procedure re-eval: Immediately prior to the procedure, the patient was reevaluated and found suitable for the planned procedure and any planned medications. Location details: face  Wound length:2.5 cm or less  Anesthesia: local infiltration    Anesthesia:  Local Anesthetic: lidocaine 1% with epinephrine  Anesthetic total: 2 mL  Foreign bodies: no foreign bodies  Irrigation solution: saline  Irrigation method: Irrigated copiously with normal saline.   Skin closure: gut (5-0)  Number of sutures: 2  Technique: simple and interrupted  Approximation: close  Dressing: antibiotic ointment  Patient tolerance: patient tolerated the procedure well with no immediate complications  My total time at bedside, performing this procedure was 1-15 minutes. Assessment/plan: Dog bite to face -wound sutured. Tetanus updated. Dog's rabies shot up-to-date per patient. Home with wound care instructions and Augmentin prescription. Return precautions discussed.   Jacky Lin MD  12:37 AM

## 2021-03-10 NOTE — ED TRIAGE NOTES
Pt has multiple bites from the family dog on pt's face. Pt thinks rabies shots for dog are up to date.

## 2021-07-12 PROCEDURE — 99285 EMERGENCY DEPT VISIT HI MDM: CPT

## 2021-07-13 ENCOUNTER — APPOINTMENT (OUTPATIENT)
Dept: GENERAL RADIOLOGY | Age: 31
End: 2021-07-13
Attending: EMERGENCY MEDICINE
Payer: COMMERCIAL

## 2021-07-13 ENCOUNTER — APPOINTMENT (OUTPATIENT)
Dept: CT IMAGING | Age: 31
End: 2021-07-13
Attending: EMERGENCY MEDICINE
Payer: COMMERCIAL

## 2021-07-13 ENCOUNTER — HOSPITAL ENCOUNTER (EMERGENCY)
Age: 31
Discharge: ANOTHER HOSPITAL | End: 2021-07-13
Attending: EMERGENCY MEDICINE
Payer: COMMERCIAL

## 2021-07-13 VITALS
OXYGEN SATURATION: 100 % | RESPIRATION RATE: 20 BRPM | DIASTOLIC BLOOD PRESSURE: 68 MMHG | HEART RATE: 106 BPM | TEMPERATURE: 98.3 F | WEIGHT: 160.94 LBS | HEIGHT: 67 IN | BODY MASS INDEX: 25.26 KG/M2 | SYSTOLIC BLOOD PRESSURE: 140 MMHG

## 2021-07-13 DIAGNOSIS — R79.89 ELEVATED LIVER FUNCTION TESTS: ICD-10-CM

## 2021-07-13 DIAGNOSIS — S27.0XXA TRAUMATIC PNEUMOTHORAX, INITIAL ENCOUNTER: Primary | ICD-10-CM

## 2021-07-13 DIAGNOSIS — K76.9 LIVER LESION: ICD-10-CM

## 2021-07-13 LAB
ALBUMIN SERPL-MCNC: 3.7 G/DL (ref 3.5–5)
ALBUMIN/GLOB SERPL: 1 {RATIO} (ref 1.1–2.2)
ALP SERPL-CCNC: 136 U/L (ref 45–117)
ALT SERPL-CCNC: 126 U/L (ref 12–78)
ANION GAP SERPL CALC-SCNC: 14 MMOL/L (ref 5–15)
AST SERPL-CCNC: 225 U/L (ref 15–37)
BASOPHILS # BLD: 0.1 K/UL (ref 0–0.1)
BASOPHILS NFR BLD: 1 % (ref 0–1)
BILIRUB SERPL-MCNC: 1.5 MG/DL (ref 0.2–1)
BUN SERPL-MCNC: 5 MG/DL (ref 6–20)
BUN/CREAT SERPL: 5 (ref 12–20)
CALCIUM SERPL-MCNC: 7.9 MG/DL (ref 8.5–10.1)
CHLORIDE SERPL-SCNC: 98 MMOL/L (ref 97–108)
CO2 SERPL-SCNC: 25 MMOL/L (ref 21–32)
CREAT SERPL-MCNC: 0.92 MG/DL (ref 0.55–1.02)
DIFFERENTIAL METHOD BLD: ABNORMAL
EOSINOPHIL # BLD: 0.2 K/UL (ref 0–0.4)
EOSINOPHIL NFR BLD: 3 % (ref 0–7)
ERYTHROCYTE [DISTWIDTH] IN BLOOD BY AUTOMATED COUNT: 19.3 % (ref 11.5–14.5)
GLOBULIN SER CALC-MCNC: 3.6 G/DL (ref 2–4)
GLUCOSE SERPL-MCNC: 88 MG/DL (ref 65–100)
HCG UR QL: NEGATIVE
HCT VFR BLD AUTO: 39.5 % (ref 35–47)
HGB BLD-MCNC: 13.7 G/DL (ref 11.5–16)
IMM GRANULOCYTES # BLD AUTO: 0.1 K/UL (ref 0–0.04)
IMM GRANULOCYTES NFR BLD AUTO: 1 % (ref 0–0.5)
LYMPHOCYTES # BLD: 1.8 K/UL (ref 0.8–3.5)
LYMPHOCYTES NFR BLD: 28 % (ref 12–49)
MCH RBC QN AUTO: 31.4 PG (ref 26–34)
MCHC RBC AUTO-ENTMCNC: 34.7 G/DL (ref 30–36.5)
MCV RBC AUTO: 90.4 FL (ref 80–99)
MONOCYTES # BLD: 0.6 K/UL (ref 0–1)
MONOCYTES NFR BLD: 9 % (ref 5–13)
NEUTS SEG # BLD: 3.5 K/UL (ref 1.8–8)
NEUTS SEG NFR BLD: 58 % (ref 32–75)
NRBC # BLD: 0 K/UL (ref 0–0.01)
NRBC BLD-RTO: 0 PER 100 WBC
PLATELET # BLD AUTO: 238 K/UL (ref 150–400)
PMV BLD AUTO: 11 FL (ref 8.9–12.9)
POTASSIUM SERPL-SCNC: 3 MMOL/L (ref 3.5–5.1)
PROT SERPL-MCNC: 7.3 G/DL (ref 6.4–8.2)
RBC # BLD AUTO: 4.37 M/UL (ref 3.8–5.2)
RBC MORPH BLD: ABNORMAL
SODIUM SERPL-SCNC: 137 MMOL/L (ref 136–145)
TROPONIN I SERPL-MCNC: <0.05 NG/ML
WBC # BLD AUTO: 6.3 K/UL (ref 3.6–11)

## 2021-07-13 PROCEDURE — 85025 COMPLETE CBC W/AUTO DIFF WBC: CPT

## 2021-07-13 PROCEDURE — 93005 ELECTROCARDIOGRAM TRACING: CPT

## 2021-07-13 PROCEDURE — 81025 URINE PREGNANCY TEST: CPT

## 2021-07-13 PROCEDURE — 96374 THER/PROPH/DIAG INJ IV PUSH: CPT

## 2021-07-13 PROCEDURE — 80053 COMPREHEN METABOLIC PANEL: CPT

## 2021-07-13 PROCEDURE — 74011000250 HC RX REV CODE- 250: Performed by: EMERGENCY MEDICINE

## 2021-07-13 PROCEDURE — 84484 ASSAY OF TROPONIN QUANT: CPT

## 2021-07-13 PROCEDURE — 96375 TX/PRO/DX INJ NEW DRUG ADDON: CPT

## 2021-07-13 PROCEDURE — 75810000165 HC THORACENTESIS

## 2021-07-13 PROCEDURE — 74011250636 HC RX REV CODE- 250/636: Performed by: EMERGENCY MEDICINE

## 2021-07-13 PROCEDURE — 36415 COLL VENOUS BLD VENIPUNCTURE: CPT

## 2021-07-13 PROCEDURE — 71250 CT THORAX DX C-: CPT

## 2021-07-13 PROCEDURE — 71045 X-RAY EXAM CHEST 1 VIEW: CPT

## 2021-07-13 RX ORDER — HYDROMORPHONE HYDROCHLORIDE 1 MG/ML
1 INJECTION, SOLUTION INTRAMUSCULAR; INTRAVENOUS; SUBCUTANEOUS
Status: COMPLETED | OUTPATIENT
Start: 2021-07-13 | End: 2021-07-13

## 2021-07-13 RX ORDER — ONDANSETRON 2 MG/ML
4 INJECTION INTRAMUSCULAR; INTRAVENOUS
Status: COMPLETED | OUTPATIENT
Start: 2021-07-13 | End: 2021-07-13

## 2021-07-13 RX ORDER — HYDROMORPHONE HYDROCHLORIDE 1 MG/ML
1 INJECTION, SOLUTION INTRAMUSCULAR; INTRAVENOUS; SUBCUTANEOUS ONCE
Status: COMPLETED | OUTPATIENT
Start: 2021-07-13 | End: 2021-07-13

## 2021-07-13 RX ORDER — HYDROMORPHONE HYDROCHLORIDE 1 MG/ML
INJECTION, SOLUTION INTRAMUSCULAR; INTRAVENOUS; SUBCUTANEOUS
Status: DISCONTINUED
Start: 2021-07-13 | End: 2021-07-13 | Stop reason: HOSPADM

## 2021-07-13 RX ORDER — KETOROLAC TROMETHAMINE 30 MG/ML
15 INJECTION, SOLUTION INTRAMUSCULAR; INTRAVENOUS
Status: COMPLETED | OUTPATIENT
Start: 2021-07-13 | End: 2021-07-13

## 2021-07-13 RX ORDER — DIAZEPAM 10 MG/2ML
2 INJECTION INTRAMUSCULAR
Status: COMPLETED | OUTPATIENT
Start: 2021-07-13 | End: 2021-07-13

## 2021-07-13 RX ORDER — LIDOCAINE HYDROCHLORIDE AND EPINEPHRINE 10; 10 MG/ML; UG/ML
1.5 INJECTION, SOLUTION INFILTRATION; PERINEURAL ONCE
Status: COMPLETED | OUTPATIENT
Start: 2021-07-13 | End: 2021-07-13

## 2021-07-13 RX ADMIN — LIDOCAINE HYDROCHLORIDE,EPINEPHRINE BITARTRATE 15 MG: 10; .01 INJECTION, SOLUTION INFILTRATION; PERINEURAL at 03:58

## 2021-07-13 RX ADMIN — HYDROMORPHONE HYDROCHLORIDE 1 MG: 1 INJECTION, SOLUTION INTRAMUSCULAR; INTRAVENOUS; SUBCUTANEOUS at 03:56

## 2021-07-13 RX ADMIN — DIAZEPAM 2 MG: 5 INJECTION, SOLUTION INTRAMUSCULAR; INTRAVENOUS at 04:59

## 2021-07-13 RX ADMIN — HYDROMORPHONE HYDROCHLORIDE 1 MG: 1 INJECTION, SOLUTION INTRAMUSCULAR; INTRAVENOUS; SUBCUTANEOUS at 04:16

## 2021-07-13 RX ADMIN — SODIUM CHLORIDE 1000 ML: 9 INJECTION, SOLUTION INTRAVENOUS at 03:55

## 2021-07-13 RX ADMIN — KETOROLAC TROMETHAMINE 15 MG: 30 INJECTION, SOLUTION INTRAMUSCULAR; INTRAVENOUS at 00:46

## 2021-07-13 RX ADMIN — ONDANSETRON 4 MG: 2 INJECTION INTRAMUSCULAR; INTRAVENOUS at 04:19

## 2021-07-13 RX ADMIN — ONDANSETRON 4 MG: 2 INJECTION INTRAMUSCULAR; INTRAVENOUS at 03:56
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
## 2021-08-11 ENCOUNTER — OFFICE VISIT (OUTPATIENT)
Dept: NEUROLOGY | Age: 31
End: 2021-08-11
Payer: COMMERCIAL

## 2021-08-11 VITALS
RESPIRATION RATE: 16 BRPM | HEIGHT: 67 IN | OXYGEN SATURATION: 99 % | HEART RATE: 118 BPM | SYSTOLIC BLOOD PRESSURE: 138 MMHG | DIASTOLIC BLOOD PRESSURE: 80 MMHG | BODY MASS INDEX: 24.33 KG/M2 | WEIGHT: 155 LBS

## 2021-08-11 DIAGNOSIS — R20.2 NUMBNESS AND TINGLING OF BOTH FEET: Primary | ICD-10-CM

## 2021-08-11 DIAGNOSIS — R20.0 NUMBNESS AND TINGLING OF BOTH FEET: Primary | ICD-10-CM

## 2021-08-11 PROCEDURE — 99204 OFFICE O/P NEW MOD 45 MIN: CPT | Performed by: PSYCHIATRY & NEUROLOGY

## 2021-08-11 RX ORDER — UREA 10 %
100 LOTION (ML) TOPICAL DAILY
Status: ON HOLD | COMMUNITY
End: 2022-08-04

## 2021-08-11 RX ORDER — GABAPENTIN 100 MG/1
CAPSULE ORAL
Qty: 120 CAPSULE | Refills: 1 | Status: SHIPPED | OUTPATIENT
Start: 2021-08-11 | End: 2021-09-28

## 2021-08-11 NOTE — PROGRESS NOTES
Chief Complaint   Patient presents with    New Patient     numbness and tingling bilateral feet since March     Visit Vitals  /80 (BP 1 Location: Left upper arm, BP Patient Position: Sitting)   Pulse (!) 118   Resp 16   Ht 5' 7\" (1.702 m)   Wt 70.3 kg (155 lb)   SpO2 99%   BMI 24.28 kg/m²
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
## 2021-08-11 NOTE — LETTER
8/31/2021    Patient: Ginger Garrett   YOB: 1990   Date of Visit: 8/11/2021     Jun López NP  [address] [facility] [address] [address]  Via Fax: [phone]    Dear Jun López NP,      Thank you for referring Ms. Ginger Garrett to [facility] for evaluation. My notes for this consultation are attached. If you have questions, please do not hesitate to call me. I look forward to following your patient along with you.       Sincerely,    Estrella Kellogg MD

## 2021-08-16 LAB
VIT B1 BLD-SCNC: 128.5 NMOL/L (ref 66.5–200)
VIT B6 SERPL-MCNC: 7.1 UG/L (ref 2–32.8)

## 2021-08-17 LAB
ALBUMIN SERPL ELPH-MCNC: 4.2 G/DL (ref 2.9–4.4)
ALBUMIN/GLOB SERPL: 1.3 {RATIO} (ref 0.7–1.7)
ALPHA1 GLOB SERPL ELPH-MCNC: 0.3 G/DL (ref 0–0.4)
ALPHA2 GLOB SERPL ELPH-MCNC: 0.8 G/DL (ref 0.4–1)
B-GLOBULIN SERPL ELPH-MCNC: 1.2 G/DL (ref 0.7–1.3)
FOLATE SERPL-MCNC: <2 NG/ML
GAMMA GLOB SERPL ELPH-MCNC: 1.3 G/DL (ref 0.4–1.8)
GLOBULIN SER-MCNC: 3.5 G/DL (ref 2.2–3.9)
IGA SERPL-MCNC: 280 MG/DL (ref 87–352)
IGG SERPL-MCNC: 985 MG/DL (ref 586–1602)
IGM SERPL-MCNC: 292 MG/DL (ref 26–217)
INTERPRETATION SERPL IEP-IMP: ABNORMAL
M PROTEIN SERPL ELPH-MCNC: ABNORMAL G/DL
PLEASE NOTE:, 149534: ABNORMAL
PROT SERPL-MCNC: 7.7 G/DL (ref 6–8.5)
VIT B12 SERPL-MCNC: >2000 PG/ML (ref 232–1245)

## 2021-09-20 ENCOUNTER — OFFICE VISIT (OUTPATIENT)
Dept: NEUROLOGY | Age: 31
End: 2021-09-20
Payer: COMMERCIAL

## 2021-09-20 DIAGNOSIS — M54.16 LEFT LUMBAR RADICULOPATHY: Primary | ICD-10-CM

## 2021-09-20 DIAGNOSIS — R20.0 NUMBNESS AND TINGLING OF BOTH FEET: ICD-10-CM

## 2021-09-20 DIAGNOSIS — R20.2 NUMBNESS AND TINGLING OF BOTH FEET: ICD-10-CM

## 2021-09-20 PROCEDURE — 95911 NRV CNDJ TEST 9-10 STUDIES: CPT | Performed by: PSYCHIATRY & NEUROLOGY

## 2021-09-20 PROCEDURE — 95886 MUSC TEST DONE W/N TEST COMP: CPT | Performed by: PSYCHIATRY & NEUROLOGY

## 2021-09-20 NOTE — PROCEDURES
EMG/ NCS Report  [facility] - INPATIENT  [place]  [place], [address]   Ph: [phone]   FAX: [phone]/ [phone]    Test Date:  2021    Patient: Maye Pressley : 1990 Physician: Braeden De Los Santos M.D. Sex: Female Height: ' \" Ref Phys: Braeden De Los Santos M.D.   ID#: [id number] Weight:  lbs. Technician: Ani Tiwari     Patient History:    CC: Paresthesia    EMG & NCV Findings:  Evaluation of the left Fibular motor and the right Fibular motor nerves showed normal distal onset latency (L3.6, R3.8 ms), normal amplitude (L4.3, R5.6 mV), normal conduction velocity (B Fib-Ankle, L43, R46 m/s), and normal conduction velocity (Poplt-B Fib, L63, R77 m/s). The left tibial motor and the right tibial motor nerves showed normal distal onset latency (L3.9, R3.4 ms), normal amplitude (L5.4, R8.5 mV), and normal conduction velocity (Knee-Ankle, L40, R40 m/s). The left Sup Fibular sensory nerve showed normal distal peak latency (2.8 ms), reduced amplitude (4.1 µV), and normal conduction velocity (Lower leg-Lat ankle, 40 m/s). The right Sup Fibular sensory nerve showed normal distal peak latency (2.8 ms), normal amplitude (10.4 µV), and normal conduction velocity (Lower leg-Lat ankle, 48 m/s). The left sural sensory and the right sural sensory nerves showed normal distal peak latency (L2.5, R4.2 ms) and normal amplitude (L18.1, R4.6 µV). All F Wave latencies were within normal limits. All F Wave left vs. right side latency differences were within normal limits. All H Reflex left vs. right side latency differences were within normal limits. Needle evaluation of the left medial gastrocnemius muscle showed increased insertional activity and moderately increased spontaneous activity. The left Lower Lumb Parasp muscle showed increased insertional activity and slightly increased spontaneous activity.   The right Lower Lumb Parasp muscle showed increased insertional activity. The left posterior tibialis muscle showed increased insertional activity, moderately increased spontaneous activity, and diminished recruitment. All remaining muscles (as indicated in the following table) showed no evidence of electrical instability.         Impression:    Chronic, active left S1 lumbar radiculopathy    No electrodiagnostic evidence of large fiber polyneuropathy or right lumbar motor radiculopathy    Recommend correlation with imaging of lumbar spine (MRI without contrast)    ___________________________  Zach Asher M.D.      Nerve Conduction Studies  Anti Sensory Summary Table     Stim Site NR Peak (ms) Norm Peak (ms) P-T Amp (µV) Norm P-T Amp Site1 Site2 Dist (cm)   Left Sup Fibular Anti Sensory (Lat ankle)  33°C   Lower leg    2.8 <4.5 4.1 >5 Lower leg Lat ankle 10.0   Site 2    3.3  6.3       Site 3    3.1  5.8       Site 4    3.4  3.8       Right Sup Fibular Anti Sensory (Lat ankle)  32°C   Lower leg    2.8 <4.5 10.4 >5 Lower leg Lat ankle 10.0   Site 2    2.5  6.4       Left Sural Anti Sensory (Lat Mall)  33.1°C   Calf    2.5 <4.5 18.1 >4.0 Calf Lat Mall 14.0   Site 3    2.3  9.7           1.9  6.2       Right Sural Anti Sensory (Lat Mall)  32.1°C   Calf    4.2 <4.5 4.6 >4.0 Calf Lat Mall 14.0   Site 2    3.9  6.5         Motor Summary Table     Stim Site NR Onset (ms) Norm Onset (ms) O-P Amp (mV) Norm O-P Amp Amp (Prev) (%) Site1 Site2 Dist (cm) Jarek (m/s) Norm Jarek (m/s)   Left Fibular Motor (Ext Dig Brev)  33°C   Ankle    3.6 <6.5 4.3 >2.6 100.0 Ankle Ext Dig Brev 8.0     B Fib    10.5  3.9  90.7 B Fib Ankle 30.0 43 >38   Poplt    12.1  4.0  102.6 Poplt B Fib 10.0 63 >42   Right Fibular Motor (Ext Dig Brev)  32.8°C   Ankle    3.8 <6.5 5.6 >2.6 100.0 Ankle Ext Dig Brev 8.0     B Fib    10.1  5.2  92.9 B Fib Ankle 29.0 46 >38   Poplt    11.4  5.3  101.9 Poplt B Fib 10.0 77 >42   Left Tibial Motor (Abd Fish Brev)  32.8°C   Ankle    3.9 <6.1 5.4 >5.3 100.0 Ankle Abd Fish Brev 8.0     Knee    11.6  4.8  88.9 Knee Ankle 31.0 40 >39   Right Tibial Motor (Abd Fish Brev)  32.8°C   Ankle    3.4 <6.1 8.5 >5.3 100.0 Ankle Abd Fish Brev 8.0     Knee    12.0  5.5  64.7 Knee Ankle 34.0 40 >39     F Wave Studies     NR F-Lat (ms) Lat Norm (ms) L-R F-Lat (ms) L-R Lat Norm   Left Tibial (Mrkrs) (Abd Hallucis)  32.6°C      47.74 <56 0.00 <5.7   Right Tibial (Mrkrs) (Abd Hallucis)  32.6°C      47.74 <56 0.00 <5.7     H Reflex Studies     NR H-Lat (ms) L-R H-Lat (ms) L-R Lat Norm   Left Tibial (Gastroc)  32.4°C      31.48 0.00 <2.0   Right Tibial (Gastroc)  32.5°C      31.48 0.00 <2.0     EMG     Side Muscle Nerve Root Ins Act Fibs Psw Recrt Duration Amp Poly Comment   Left Lower Lumb Parasp Rami L5,S1 Incr Nml 1+ Nml Nml Nml Nml    Left MedGastroc Tibial S1-2 Incr 1+ 2+ Nml Nml Nml Nml    Left AntTibialis Dp Br Peron L4-5 Nml Nml Nml Nml Nml Nml Nml    Left VastusMed Femoral L2-4 Nml Nml Nml Nml Nml Nml Nml    Left GluteusMax InfGluteal L5-S2 Nml Nml Nml Nml Nml Nml Nml    Left GluteusMed SupGluteal L4-S1 Nml Nml Nml Nml Nml Nml Nml    Left PostTibialis Tibial L5, S1 Incr Nml 2+ Reduced Nml Nml Nml pain from needle   Left BicepsFemL Sciatic L5-S2 Nml Nml Nml Nml Nml Nml Nml    Left Ext Dig Brev Dp Br Peron L5, S1 Nml Nml Nml Nml Nml Nml Nml    Right AntTibialis Dp Br Peron L4-5 Nml Nml Nml Nml Nml Nml Nml    Right VastusMed Femoral L2-4 Nml Nml Nml Nml Nml Nml Nml    Right GluteusMax InfGluteal L5-S2 Nml Nml Nml Nml Nml Nml Nml    Right GluteusMed SupGluteal L4-S1 Nml Nml Nml Nml Nml Nml Nml    Right Lower Lumb Parasp Rami L5,S1 Incr Nml Nml Nml Nml Nml Nml crd x 1   Right BicepsFemL Sciatic L5-S2 Nml Nml Nml Nml Nml Nml Nml    Right Ext Dig Brev Dp Br Peron L5, S1 Nml Nml Nml Nml Nml Nml Nml    Right MedGastroc Tibial S1-2 Nml Nml Nml Nml Nml Nml Nml      Waveforms:

## 2021-09-22 ENCOUNTER — HOSPITAL ENCOUNTER (OUTPATIENT)
Dept: MRI IMAGING | Age: 31
Discharge: HOME | End: 2021-09-22
Attending: PSYCHIATRY & NEUROLOGY
Payer: COMMERCIAL

## 2021-09-22 DIAGNOSIS — M54.16 LEFT LUMBAR RADICULOPATHY: ICD-10-CM

## 2021-09-22 DIAGNOSIS — R20.0 NUMBNESS AND TINGLING OF BOTH FEET: ICD-10-CM

## 2021-09-22 DIAGNOSIS — R20.2 NUMBNESS AND TINGLING OF BOTH FEET: ICD-10-CM

## 2021-09-22 PROCEDURE — 72148 MRI LUMBAR SPINE W/O DYE: CPT

## 2021-09-28 ENCOUNTER — OFFICE VISIT (OUTPATIENT)
Dept: NEUROLOGY | Age: 31
End: 2021-09-28
Payer: COMMERCIAL

## 2021-09-28 VITALS
BODY MASS INDEX: 25.11 KG/M2 | OXYGEN SATURATION: 99 % | WEIGHT: 160 LBS | HEIGHT: 67 IN | DIASTOLIC BLOOD PRESSURE: 72 MMHG | HEART RATE: 108 BPM | SYSTOLIC BLOOD PRESSURE: 122 MMHG

## 2021-09-28 DIAGNOSIS — M54.16 LEFT LUMBAR RADICULOPATHY: ICD-10-CM

## 2021-09-28 DIAGNOSIS — R77.8 ABNORMAL SPEP: ICD-10-CM

## 2021-09-28 DIAGNOSIS — R74.8 ELEVATED VITAMIN B12 LEVEL: ICD-10-CM

## 2021-09-28 DIAGNOSIS — M51.36 DEGENERATION OF LUMBAR INTERVERTEBRAL DISC: ICD-10-CM

## 2021-09-28 DIAGNOSIS — G25.81 RESTLESS LEG SYNDROME: Primary | ICD-10-CM

## 2021-09-28 PROCEDURE — 99214 OFFICE O/P EST MOD 30 MIN: CPT | Performed by: PSYCHIATRY & NEUROLOGY

## 2021-09-28 NOTE — LETTER
9/28/2021    Patient: Edgar Camarillo   YOB: 1990   Date of Visit: 9/28/2021     Montserrat Hoover NP  [facility] [facility] [address] [address]  Via Fax: [phone]    Dear Montserrat Hoover NP,      Thank you for referring Ms. Edgar Camarillo to [facility] for evaluation. My notes for this consultation are attached. If you have questions, please do not hesitate to call me. I look forward to following your patient along with you.       Sincerely,    Mordecai Schaumann, MD

## 2021-09-28 NOTE — PROGRESS NOTES
Charu Walton (1990) is a 27 y.o. female, established patient, here for evaluation of the following     Chief complaint(s):   Chief Complaint   Patient presents with    Follow-up     MRI results        SUBJECTIVE/ OBJECTIVE:    HPI: 27 y.o. female      Reviewed the following:     Labs:    Vitamin B1 128 (normal)  Vitamin B6 7.1 (normal)  Vitamin B12 > 2000 (high, reference range 232 to 1245)  Folate < 2.0 (low, reference range is > 3.0)  SPEP mild elevated IgM level 292 (High; reference range )  Polycolonal increase detected in one or more immunoglobulins. No M-spike. EMG (9-): Chronic, active left S1 lumbar radiculopathy. No electrodiagnostic evidence of large fiber polyneuropathy, or right lumbar motor radiculopathy. Recommend correlation with imaging of lumbar spine (MRI without contrast). MRI L-spine (9-)  Mild disc degenerative change at L4-5 and L5-S1. Mild to moderate left foraminal stenosis at L5-S1 with mild effacement of nerve roots extending to the left S1 foramen. Minimal left foraminal stenosis at L4-5. Pt reports the feet/ leg symptoms mostly bother her when sitting down or any extended period, when lying in bed to go to sleep; less noticeable when she's standing walking around or otherwise occupied. Taking 2 caps gabapentin QHS, sometimes a 3rd cap. Helps her get to sleep. Avoids taking during daytime because of drowsiness. Leg/ feet pain return after waking up in AM    Separately, she notes having episodic flares of back pain and left leg sciatic type pain once a month or every other month that are severe and disabling. We discussed the option of pursuing lumbar RADHA versus increasing gabapentin. She didn't want to pursue the RADHA at this point.      Review of Systems: as above    ========================================    Brief Hx:     Initial Visit: 8- (see for full details)    Labs (8-):    Vitamin B1 128 (normal)  Vitamin B6 7.1 (normal)  Vitamin B12 > 2000 (high, reference range 232 to 1245)  Folate < 2.0 (low, reference range is > 3.0)  SPEP mild elevated IgM level 292 (High; reference range )  Polycolonal increase detected in one or more immunoglobulins. No M-spike. EMG (9-): Chronic, active left S1 lumbar radiculopathy. No electrodiagnostic evidence of large fiber polyneuropathy, or right lumbar motor radiculopathy. Recommend correlation with imaging of lumbar spine (MRI without contrast). MRI L-spine (9-)  Mild disc degenerative change at L4-5 and L5-S1. Mild to moderate left foraminal stenosis at L5-S1 with mild effacement of nerve roots extending to the left S1 foramen. Minimal left foraminal stenosis at L4-5. Pt reports the feet/ leg symptoms mostly bother her when sitting down or any extended period, when lying in bed to go to sleep; less noticeable when she's standing walking around or otherwise occupied. Allergies   Allergen Reactions    Imitrex [Sumatriptan Succinate] Palpitations    Imitrex [Sumatriptan] Palpitations         Current Outpatient Medications:     gabapentin 300 mg Tb24, Take 300 mg by mouth every evening for 90 days. Max Daily Amount: 300 mg. For Restless leg symptoms, Disp: 90 Tablet, Rfl: 0    cyanocobalamin (Vitamin B-12) 100 mcg tablet, Take 100 mcg by mouth daily. , Disp: , Rfl:     escitalopram oxalate (Lexapro) 10 mg tablet, Take 10 mg by mouth daily. , Disp: , Rfl:     ALPRAZOLAM PO, Take 0.5 mg by mouth daily as needed. , Disp: , Rfl:      has a past medical history of Asthma, Heart murmur, Raynaud disease, and UTI (lower urinary tract infection). has a past surgical history that includes pr revision of unstable patella; hx orthopaedic; and hx wisdom teeth extraction.       Physical Exam:    Vitals:    09/28/21 1524   BP: 122/72   BP 1 Location: Left upper arm   BP Patient Position: Sitting   Pulse: (!) 108   Height: 5' 7\" (1.702 m)   Weight: 72.6 kg (160 lb) SpO2: 99%     No exam today  Spent visit discussing test results    ========================================    ASSESSMENT/ PLAN:       ICD-10-CM ICD-9-CM    1. Restless leg syndrome  G25.81 333.94 gabapentin 300 mg Tb24   2. Abnormal SPEP  R77.8 790.99 REFERRAL TO HEMATOLOGY ONCOLOGY   3. Left lumbar radiculopathy  M54.16 724.4    4. Degeneration of lumbar intervertebral disc  M51.36 722.52    5. Elevated vitamin B12 level  R74.8 790.99       Bilateral lower extremity paresthesias, suggestive of restless leg syndrome: Changed Gabapentin to Extended Release 300 mg one tab every evening. If after 3 weeks she cannot tell significant improvement in her leg symptoms, then she was instructed she increase it to 2 tabs every evening. Left lumbar radiculopathy, lumbar disc degeneration: Patient defers on any lumbar RADHA at this point. We will see how increasing/changing her gabapentin affects her back pain and left leg radicular pain. Mild abnormal SPEP result: Refer to hematology to review this and determine if any clinical significance. Elevated vitamin B12 level: Patient reports she is taking 3000 mg of vitamin B-12 daily. I advised her to cut back to 1000 mg daily. Have B12 recheck with PCP in 3 months. Follow up in 3 months      An electronic signature was used to authenticate this note.   -- Cooper Deluca MD

## 2021-09-28 NOTE — PROGRESS NOTES
Chief Complaint   Patient presents with    Follow-up     MRI results      Visit Vitals  /72 (BP 1 Location: Left upper arm, BP Patient Position: Sitting)   Pulse (!) 108   Ht 5' 7\" (1.702 m)   Wt 72.6 kg (160 lb)   SpO2 99%   BMI 25.06 kg/m²

## 2021-09-28 NOTE — PATIENT INSTRUCTIONS
See Hematologist so they can review the SPEP result (there as a minor abnormality); see if he/ she thinks there's any clinical relevance. Changed your Gabapentin to 300 mg Extended Release, one tablet every evening, for your leg symptoms which I think is restless leg (since no neuropathy found on EMG test). If after 3 weeks you can't tell a difference/ improvement in your leg symptoms, you can increase your Gabapentin to TWO tablets in the evening.        Follow up with Neuorlogist in 3 months

## 2021-10-05 ENCOUNTER — TELEPHONE (OUTPATIENT)
Dept: NEUROLOGY | Age: 31
End: 2021-10-05

## 2021-10-05 DIAGNOSIS — G25.81 RESTLESS LEG SYNDROME: Primary | ICD-10-CM

## 2021-10-05 RX ORDER — GABAPENTIN 300 MG/1
300 CAPSULE ORAL DAILY
Qty: 90 CAPSULE | Refills: 0 | OUTPATIENT
Start: 2021-10-05 | End: 2022-01-03

## 2021-10-05 RX ORDER — GABAPENTIN ENACARBIL 300 MG/1
1 TABLET, EXTENDED RELEASE ORAL
Qty: 90 TABLET | Refills: 0 | Status: SHIPPED | OUTPATIENT
Start: 2021-10-05 | End: 2022-01-03

## 2021-10-05 NOTE — TELEPHONE ENCOUNTER
----- Message from [facility] [employer] sent at 10/5/2021  3:06 PM EDT -----  Regarding: Mj Guerra Message/Vendor Calls    Caller's first and last name:      Reason for call: Patient Was Mikey Mariano Dr Heriberto Johnson , Pharmacist is having a hard time getting the medicine they told her call her doctor        Callback required yes/no and why: yes       Best contact number(s):[phone]      Details to clarify the request: I think they are having a hard time getting it , she might need a prescription sent into the pharmacist       [place] [employer].

## 2021-10-05 NOTE — TELEPHONE ENCOUNTER
----- Message from Travon Llamas MD sent at 10/5/2021  9:03 AM EDT -----  Regarding: Check with pharmacy  Pharmacy told pt they'd didn't have Gabapentin  mg tablet. I reordered it as Gabapentin enacarbil (Horizant) 300 mg one tab at night (#90) which is the ER version and sent back, but I don't think it actually went through as computer didn't ask me to sign it twice. Call pharmacy to see if they got it, and if not, please give them verbal authorization to fill it.      Also tell patient to check on [facility] to see if she can get a copay card to reduce cost.

## 2021-11-04 ENCOUNTER — OFFICE VISIT (OUTPATIENT)
Dept: ONCOLOGY | Age: 31
End: 2021-11-04
Payer: COMMERCIAL

## 2021-11-04 VITALS
SYSTOLIC BLOOD PRESSURE: 158 MMHG | RESPIRATION RATE: 18 BRPM | WEIGHT: 154 LBS | HEART RATE: 99 BPM | DIASTOLIC BLOOD PRESSURE: 116 MMHG | TEMPERATURE: 98 F | HEIGHT: 67 IN | OXYGEN SATURATION: 99 % | BODY MASS INDEX: 24.17 KG/M2

## 2021-11-04 DIAGNOSIS — E53.8 FOLATE DEFICIENCY: ICD-10-CM

## 2021-11-04 DIAGNOSIS — R16.0 LIVER MASS: ICD-10-CM

## 2021-11-04 DIAGNOSIS — F17.213 NICOTINE DEPENDENCE, CIGARETTES, WITH WITHDRAWAL: ICD-10-CM

## 2021-11-04 DIAGNOSIS — R76.0 ELEVATED ANTIBODY LEVELS: Primary | ICD-10-CM

## 2021-11-04 PROCEDURE — 99204 OFFICE O/P NEW MOD 45 MIN: CPT | Performed by: INTERNAL MEDICINE

## 2021-11-04 NOTE — PROGRESS NOTES
Vida Mackey is a 27 y.o. female Follow up for the evaluation of abnormal SPEP. 1. Have you been to the ER, urgent care clinic since your last visit? Hospitalized since your last visit? Yes    2. Have you seen or consulted any other health care providers outside of the [address] [place] [address] since your last visit? Include any pap smears or colon screening.  No

## 2021-11-04 NOTE — LETTER
11/9/2021    Patient: Sherie Flanagan   YOB: 1990   Date of Visit: 11/4/2021     Kira Grullon NP  [address] [address]  Via Fax: [phone]     Obed Bhatia MD  [address] [facility]  [address]  [address] [place] [address]    Dear FAYE Fong MD,      Thank you for referring Ms. Sherie Flanagan to [facility] for evaluation. My notes for this consultation are attached. If you have questions, please do not hesitate to call me. I look forward to following your patient along with you.       Sincerely,    Christine Lara MD

## 2021-11-04 NOTE — PROGRESS NOTES
[facility] [place] [address] [address] [facility]  Farheen Susan: [phone]  F: [phone] Patient ID  Name: Farooq Kapadia  YOB: 1990  MRN: [id number]  Referring Provider:   Ash Cardoso MD  [facility]  [address]  [address]  Primary Care Provider:   Juany Salvador NP       HEMATOLOGY/MEDICAL ONCOLOGY  NOTE   Date of Visit: 11/04/21  Reason for Evaluation:     Chief Complaint   Patient presents with    Follow-up    New Patient     Subjective:     History of Present Illness:     Farooq Kapadia is a 27 y.o. F who presents for an initial evaluation for elevated IgM. Patient reports dealing with chronic back pain and bilateral lower extremity neuropathy. Patient reports decreased oral intake due to a slight loss of appetite. Patient denies any bowel or bladder problems. Patient denies any uncontrolled pain. Patient denies any shortness of breath. Patient reports mild fatigue.  -Was tubing in the [facility] [address] and fell off the tube. She presented 3 days later with shortness of breath.   -She ultimately needed a chest tube for a pneumothorax;overall doing better.  -She has had numbness/neuropathy since February 2021.   -She was evaluated by Neurology and was found not to have an M Manny but rather an elevated IgM and has been referred ot our clinic.  -She has had an MRI that showed some degenerative disc changes but no clear etiology per her report. -neuropathy has slowly improved. She notes that she was started on medication for restless leg syndrome.       Past Medical History:   Diagnosis Date    Asthma     Heart murmur     Raynaud disease     UTI (lower urinary tract infection)       Past Surgical History:   Procedure Laterality Date    HX ORTHOPAEDIC      realignment on left knee    HX WISDOM TEETH EXTRACTION      [place] REVISION OF UNSTABLE PATELLA      RIGHT      Social History     Tobacco Use    Smoking status: Current Every Day Smoker     Packs/day: 0.25    Smokeless tobacco: Never Used   Substance Use Topics    Alcohol use: Yes     Alcohol/week: 5.0 standard drinks     Types: 5 Glasses of wine per week      Family History   Problem Relation Age of Onset    No Known Problems Mother     No Known Problems Father     Cancer Neg Hx         no known history but limited knowledge     Current Outpatient Medications   Medication Sig    gabapentin enacarbil (Horizant) 300 mg TbER Take 1 Tablet by mouth nightly for 90 days. Max Daily Amount: 1 Tablet. For Restless Legs.  cyanocobalamin (Vitamin B-12) 100 mcg tablet Take 100 mcg by mouth daily.  escitalopram oxalate (Lexapro) 10 mg tablet Take 10 mg by mouth daily.  ALPRAZOLAM PO Take 0.5 mg by mouth daily as needed. (Patient not taking: Reported on 11/4/2021)     No current facility-administered medications for this visit. Allergies   Allergen Reactions    Imitrex [Sumatriptan Succinate] Palpitations    Imitrex [Sumatriptan] Palpitations        Review of Systems provided by: patient  General: denies fever, denies fatigue, denies chills, denies night sweats and reports appetite loss  HEENT: denies epistaxis and denies trouble swallowing  Eyes: denies any acute vision loss and denies any eye pain  Cardio: denies any chest pain and denies any leg swelling  Resp: denies any shortness of breath and denies any hemoptysis  Abdomen: denies any abdominal pain, denies any nausea, denies any vomiting and denies any diarrhea Patient denies any hematochezia. and Patient denies any melena.   Lymph: denies any lymph node enlargement and denies any lymph node tenderness  Skin: denies any rash and denies any itching  MSK: denies any myalgias and denies any arthralgias  Neuro: denies any tremor, denies any imbalance and reports a history of headaches  Psych: denies depression and reports anxiety          Objective:     Visit Vitals  BP (!) 158/116 Comment: On recheck BP was[de-identified] 156/119. Pulse 99   Temp 98 °F (36.7 °C) (Temporal)   Resp 18   Ht 5' 7\" (1.702 m)   Wt 154 lb (69.9 kg)   SpO2 99%   BMI 24.12 kg/m²     ECOG PS: 0- Fully active, able to carry on all pre-disease performance without restriction. Physical Exam  Constitutional: No acute distress. , Non-toxic appearance. and Non-diaphoretic. HENT: Normocephalic and atraumatic head. Eyes: Normal Conjunctivae. and Anicteric sclerae. Cardiovascular: Normal heart sounds., No pitting edema., No friction rub. and No gallops auscultated. Pulmonary: Normal Respiratory Effort., No wheezing., No rhonchi. and No rales. Abdominal: Normal bowel sounds. , Soft Abdomen to palpation. , No abdominal tenderness., No guarding. and No rebound tenderness. Skin: No jaundice. and No rash. Musculoskeletal: No muscle pain on palpation. No temporal muscle wasting on inspection. Lymph: No cervical or supraclavicular lymph node enlargement or tenderness. Neurological: Alert and oriented to person, place, and time. Mental status is at baseline. and No tremor on inspection. Psychiatric: mood normal. normal speech rate and normal affect        Results:     I personally reviewed Epic EHR labs/results below:   Lab Results   Component Value Date/Time    WBC 6.3 07/13/2021 12:34 AM    HGB 13.7 07/13/2021 12:34 AM    HCT 39.5 07/13/2021 12:34 AM    PLATELET 383 74/47/6484 12:34 AM    MCV 90.4 07/13/2021 12:34 AM    ABS.  NEUTROPHILS 3.5 07/13/2021 12:34 AM     Lab Results   Component Value Date/Time    Sodium 137 07/13/2021 12:34 AM    Potassium 3.0 (L) 07/13/2021 12:34 AM    Chloride 98 07/13/2021 12:34 AM    CO2 25 07/13/2021 12:34 AM    Glucose 88 07/13/2021 12:34 AM    BUN 5 (L) 07/13/2021 12:34 AM    Creatinine 0.92 07/13/2021 12:34 AM    GFR est AA >60 07/13/2021 12:34 AM    GFR est non-AA >60 07/13/2021 12:34 AM    Calcium 7.9 (L) 07/13/2021 12:34 AM     Lab Results   Component Value Date/Time    Bilirubin, total 1.5 (H) 07/13/2021 12:34 AM ALT (SGPT) 126 (H) 07/13/2021 12:34 AM    Alk. phosphatase 136 (H) 07/13/2021 12:34 AM    Protein, total 7.7 08/11/2021 10:04 AM    Albumin 3.7 07/13/2021 12:34 AM    Globulin 3.6 07/13/2021 12:34 AM     Lab Results   Component Value Date/Time    Vitamin B12 >2000 (H) 08/11/2021 10:04 AM    Folate <2.0 (L) 08/11/2021 10:04 AM   SPEP AND BELKIS, SERUM  Order: [id number]   Collected 8/11/2021 10:04     Status: Final result     Next appt: 02/04/2022 at 10:30 AM in Oncology Denise Ovalle MD)     Dx: Numbness and tingling of both feet     0 Result Notes    Component Ref Range & Units 8/11/21 1004   Immunoglobulin G, Qt. 586 - 1,602 mg/dL 985    Immunoglobulin A, Qt. 87 - 352 mg/dL 280    Immunoglobulin M, Qt. 26 - 217 mg/dL 292 High     Protein, total 6.0 - 8.5 g/dL 7.7    Albumin 2.9 - 4.4 g/dL 4.2    Alpha-1-Globulin 0.0 - 0.4 g/dL 0.3    Alpha-2-Globulin 0.4 - 1.0 g/dL 0.8    Beta Globulin 0.7 - 1.3 g/dL 1.2    Gamma Globulin 0.4 - 1.8 g/dL 1.3    M-Manny Not Observed g/dL Not Observed    Globulin, total 2.2 - 3.9 g/dL 3.5    A/G ratio 0.7 - 1.7 1.3    Immunofixation Result Polyclonal increase detected in one or more immunoglobulins. Comment Abnormal              MRI LUMB SPINE WO CONT    Result Date: 9/22/2021  EXAM: MRI LUMB SPINE WO CONT History: Left-sided paresthesias INDICATION: feet paresthesias and left lumbar radiculopathy on EMG. Radiculopathy, lumbar region COMPARISON: None TECHNIQUE: MR imaging of the lumbar spine was performed using the following sequences: sagittal T1, T2, STIR;  axial T1, T2. CONTRAST:  None. FINDINGS: Minimal if any retrolisthesis of L5 on S1. Abdominal aorta is normal in caliber. Vertebral body heights are maintained. Marrow signal is normal. The conus medullaris terminates at L1-L2. Signal and caliber of the distal spinal cord are within normal limits. The paraspinal soft tissues are within normal limits. Lower thoracic spine: No herniation or stenosis.  L1-L2: No herniation or stenosis. L2-L3: No herniation or stenosis. L3-L4: No herniation or stenosis. L4-L5: Minimal disc desiccation. Mild left foraminal protrusion. Canal is patent. Minimal left foraminal stenosis. L5-S1: Mild facet arthropathy. Moderate left lateral recess disc protrusion with annular fissure. The canal is patent. There is mild to moderate left foraminal stenosis. Effacement of nerve roots extending to the left S1 foramen. Mild disc degenerative change at L4-5 and L5-S1. Mild to moderate left foraminal stenosis at L5-S1 with mild effacement of nerve roots extending to the left S1 foramen. Minimal left foraminal stenosis at L4-5. Assessment and Recommendations:     Diagnoses and all orders for this visit:    1. Elevated antibody levels  Discussed with patient that her elevated IgM could be reactive in as an initial immune response to an infection. We discussed that we will simply repeat the level. We will also check for 24 hour urine testing for any M spike. -otherwise no clear signs concerning for a waldenstrom's macroglobulinemia.  -will defer neuropathy work-up to Neurology (I.e. including testing for any anti-MAG IgM antibodies and other potentially relevant testing). However, she has foraminal stenosis on September 2021 MRI. -     IMMUNOGLOBULIN, QT, IGM; Future  -     IMMUNOGLOBULIN G, QT; Future  -     PROTEIN ELECTROPHORESIS + BELKIS, UR, 24HR; Future  2. Liver mass  -follows with Gastroenterology for this lesion for at least a year. She follows with Dr. Nba Dowling. 3. Nicotine dependence, cigarettes, with withdrawal  -Quit for about 3 weeks. Recommend smoking cessation and to reach out to our clinic if any specific assistance for smoking cessation. 4. Folate deficiency  -     FOLATE; Future  -No anemia but will repeat a folate level. Follow-up and Dispositions    · Return in about 3 months (around 2/4/2022).          Signed By:   Jose G Chacon MD

## 2021-11-09 PROBLEM — E53.8 FOLATE DEFICIENCY: Status: ACTIVE | Noted: 2021-11-09

## 2021-11-09 PROBLEM — E53.8 FOLATE DEFICIENCY: Status: ACTIVE | Noted: 2021-11-03

## 2022-01-25 ENCOUNTER — TELEPHONE (OUTPATIENT)
Dept: ONCOLOGY | Age: 32
End: 2022-01-25

## 2022-01-25 NOTE — TELEPHONE ENCOUNTER
[address] [facility]  [phone]    01/25/22 10:43 AM - Called and spoke with the patient. Advised she has some outstanding labs that we would like to have her drawn prior to her next appointment with Dr. Aubrie Rome. The patient states she does not have any paperwork for the labs. Inquired if she is aware of the lab located in our office building on the second floor. The patient is aware and plans to have the labs done there. Advised the lab is on a walk-in basis. The patient voiced understanding and gratitude for the call. No further questions or concerns.

## 2022-03-18 PROBLEM — R16.0 LIVER MASS: Status: ACTIVE | Noted: 2021-11-04

## 2022-03-19 PROBLEM — F17.213 NICOTINE DEPENDENCE, CIGARETTES, WITH WITHDRAWAL: Status: ACTIVE | Noted: 2021-11-04

## 2022-03-19 PROBLEM — R76.0 ELEVATED ANTIBODY LEVELS: Status: ACTIVE | Noted: 2021-11-04

## 2022-03-19 PROBLEM — E53.8 FOLATE DEFICIENCY: Status: ACTIVE | Noted: 2021-11-03

## 2022-03-19 PROBLEM — R07.9 CHEST PAIN: Status: ACTIVE | Noted: 2018-07-16

## 2022-04-18 ENCOUNTER — TELEPHONE (OUTPATIENT)
Dept: ONCOLOGY | Age: 32
End: 2022-04-18

## 2022-04-20 ENCOUNTER — TELEPHONE (OUTPATIENT)
Dept: ONCOLOGY | Age: 32
End: 2022-04-20

## 2022-04-29 ENCOUNTER — HOSPITAL ENCOUNTER (EMERGENCY)
Age: 32
Discharge: HOME | End: 2022-04-29
Attending: EMERGENCY MEDICINE
Payer: COMMERCIAL

## 2022-04-29 VITALS
OXYGEN SATURATION: 97 % | SYSTOLIC BLOOD PRESSURE: 113 MMHG | HEART RATE: 122 BPM | WEIGHT: 145 LBS | RESPIRATION RATE: 14 BRPM | DIASTOLIC BLOOD PRESSURE: 75 MMHG | BODY MASS INDEX: 22.76 KG/M2 | TEMPERATURE: 98.4 F | HEIGHT: 67 IN

## 2022-04-29 DIAGNOSIS — K85.20 ALCOHOL-INDUCED ACUTE PANCREATITIS, UNSPECIFIED COMPLICATION STATUS: Primary | ICD-10-CM

## 2022-04-29 PROCEDURE — 99283 EMERGENCY DEPT VISIT LOW MDM: CPT

## 2022-04-29 PROCEDURE — 74011250637 HC RX REV CODE- 250/637: Performed by: EMERGENCY MEDICINE

## 2022-04-29 RX ORDER — MORPHINE SULFATE 15 MG/1
45 TABLET, FILM COATED, EXTENDED RELEASE ORAL EVERY 12 HOURS
Qty: 6 TABLET | Refills: 0 | Status: SHIPPED | OUTPATIENT
Start: 2022-04-29 | End: 2022-04-30

## 2022-04-29 RX ORDER — HYDROMORPHONE HYDROCHLORIDE 2 MG/1
4 TABLET ORAL
Status: COMPLETED | OUTPATIENT
Start: 2022-04-29 | End: 2022-04-29

## 2022-04-29 RX ORDER — MORPHINE SULFATE 15 MG/1
45 TABLET, FILM COATED, EXTENDED RELEASE ORAL ONCE
Status: DISCONTINUED | OUTPATIENT
Start: 2022-04-29 | End: 2022-04-29

## 2022-04-29 RX ADMIN — HYDROMORPHONE HYDROCHLORIDE 4 MG: 2 TABLET ORAL at 22:46

## 2022-04-30 NOTE — ED PROVIDER NOTES
Patient is a 57-year-old with a history of alcohol abuse was recently hospitalized with necrotizing pancreatitis and sepsis. She was discharged from the hospital with multiple pain medications but by the time she got to the pharmacy it was closed and they were unable to transfer her prescriptions to a 24-hour pharmacy. She comes into the emergency department requesting medications to get her through the night until she can  her prescriptions in the morning. She reports that her pain is abdominal  and 10 out of 10 but not new or different pain she experienced while in the hospital.  He has not had any fevers, chills, vomiting. The history is provided by the patient. Medication Refill  Associated symptoms include abdominal pain. Past Medical History:   Diagnosis Date    Asthma     Heart murmur     Raynaud disease     UTI (lower urinary tract infection)        Past Surgical History:   Procedure Laterality Date    HX ORTHOPAEDIC      realignment on left knee    HX WISDOM TEETH EXTRACTION      NC REVISION OF UNSTABLE PATELLA      RIGHT         Family History:   Problem Relation Age of Onset    No Known Problems Mother     No Known Problems Father     Cancer Neg Hx         no known history but limited knowledge       Social History     Socioeconomic History    Marital status:      Spouse name: Not on file    Number of children: Not on file    Years of education: Not on file    Highest education level: Not on file   Occupational History    Not on file   Tobacco Use    Smoking status: Current Every Day Smoker     Packs/day: 0.25    Smokeless tobacco: Never Used   Substance and Sexual Activity    Alcohol use: Yes     Alcohol/week: 5.0 standard drinks     Types: 5 Glasses of wine per week    Drug use: No    Sexual activity: Yes   Other Topics Concern    Not on file   Social History Narrative    No known chemical exposures.      Social Determinants of Health     Financial Resource Strain:     Difficulty of Paying Living Expenses: Not on file   Food Insecurity:     Worried About Running Out of Food in the Last Year: Not on file    Aurelio of Food in the Last Year: Not on file   Transportation Needs:     Lack of Transportation (Medical): Not on file    Lack of Transportation (Non-Medical): Not on file   Physical Activity:     Days of Exercise per Week: Not on file    Minutes of Exercise per Session: Not on file   Stress:     Feeling of Stress : Not on file   Social Connections:     Frequency of Communication with Friends and Family: Not on file    Frequency of Social Gatherings with Friends and Family: Not on file    Attends [employer] Services: Not on file    Active Member of [address] [place] [address] or Organizations: Not on file    Attends Club or Organization Meetings: Not on file    Marital Status: Not on file   Intimate Partner Violence:     Fear of Current or Ex-Partner: Not on file    Emotionally Abused: Not on file    Physically Abused: Not on file    Sexually Abused: Not on file   Housing Stability:     Unable to Pay for Housing in the Last Year: Not on file    Number of [place] in the Last Year: Not on file    Unstable Housing in the Last Year: Not on file         ALLERGIES: Imitrex [sumatriptan succinate], Imitrex [sumatriptan], and Robaxin [methocarbamol]    Review of Systems   Gastrointestinal: Positive for abdominal pain. All other systems reviewed and are negative. Vitals:    04/29/22 2212 04/29/22 2230   BP: 123/79 113/75   Pulse: (!) 122    Resp: 14    Temp: 98.4 °F (36.9 °C)    SpO2: 94% 97%   Weight: 65.8 kg (145 lb)    Height: 5' 7\" (1.702 m)             Physical Exam  Vitals and nursing note reviewed. Constitutional:       Appearance: She is well-developed. HENT:      Head: Normocephalic and atraumatic. Eyes:      General: No scleral icterus. Cardiovascular:      Rate and Rhythm: Normal rate.    Pulmonary:      Effort: Pulmonary effort is normal. Abdominal:      General: There is no distension. Tenderness: There is generalized abdominal tenderness. Musculoskeletal:      Cervical back: Normal range of motion. Skin:     General: Skin is warm and dry. Findings: No erythema or rash. Neurological:      General: No focal deficit present. Mental Status: She is alert and oriented to person, place, and time. Psychiatric:         Mood and Affect: Mood normal.          MDM       Procedures      Patient presents with pain requesting medication refill. Ibuprofen 24-hour supply of her collocated as well as it is provided in the emergency department. Patient will follow-up with her pharmacy tomorrow to fill her original prescription. The patient's results have been reviewed with them and/or available family. Patient and/or family verbally conveyed their understanding and agreement of the patient's signs, symptoms, diagnosis, treatment and prognosis and additionally agree to follow up as recommended in the discharge instructions or to return to the Emergency Room should their condition change prior to their follow-up appointment. The patient/family verbally agrees with the care-plan and verbally conveys that all of their questions have been answered. The discharge instructions have also been provided to the patient and/or family with some educational information regarding the patient's diagnosis as well a list of reasons why the patient would want to return to the ER prior to their follow-up appointment, should their condition change.

## 2022-04-30 NOTE — ED NOTES
The patient was discharged home by ER MD Jenny Burns and Nurse Felix Mcwilliams RN in stable condition, accompanied by Spouse . The patient is alert and oriented, is in no respiratory distress and has vital signs within normal limits . The patient's diagnosis, condition and treatment were explained to patient and spouse. The patient/responsible party expressed understanding. Prescriptions given to pt. No work/school note given to pt. A discharge plan has been developed. A  was not involved in the process. Aftercare instructions were given to the patient.

## 2022-04-30 NOTE — ED TRIAGE NOTES
Pt arrives to ED . Pt was released from [facility] 2 hours PTA after being admitted in February for pancreatitis and had gallbladder removed during admission. Pt states she is unable to get her medications filled on discharge and spouse went back to [facility] and states they could not help get meds filled and told to go somewhere to manage pain.

## 2022-06-01 ENCOUNTER — TELEPHONE (OUTPATIENT)
Dept: ONCOLOGY | Age: 32
End: 2022-06-01

## 2022-06-03 ENCOUNTER — TELEPHONE (OUTPATIENT)
Dept: ONCOLOGY | Age: 32
End: 2022-06-03

## 2022-06-03 NOTE — TELEPHONE ENCOUNTER
[address] at [facility]  [phone]    06/03/22 8:43 AM - Attempted to call the patient. There was no answer. This nurse was unable to leave a voicemail due to the voicemail box being full.

## 2022-06-06 ENCOUNTER — TELEPHONE (OUTPATIENT)
Dept: ONCOLOGY | Age: 32
End: 2022-06-06

## 2022-07-28 ENCOUNTER — PATIENT MESSAGE (OUTPATIENT)
Dept: NEUROLOGY | Age: 32
End: 2022-07-28

## 2022-08-01 ENCOUNTER — TELEPHONE (OUTPATIENT)
Dept: NEUROLOGY | Age: 32
End: 2022-08-01

## 2022-08-04 ENCOUNTER — APPOINTMENT (OUTPATIENT)
Dept: ULTRASOUND IMAGING | Age: 32
End: 2022-08-04
Attending: INTERNAL MEDICINE
Payer: COMMERCIAL

## 2022-08-04 ENCOUNTER — ANESTHESIA (OUTPATIENT)
Dept: ENDOSCOPY | Age: 32
End: 2022-08-04
Payer: COMMERCIAL

## 2022-08-04 ENCOUNTER — HOSPITAL ENCOUNTER (OUTPATIENT)
Age: 32
Setting detail: OUTPATIENT SURGERY
Discharge: HOME | End: 2022-08-04
Attending: INTERNAL MEDICINE | Admitting: INTERNAL MEDICINE
Payer: COMMERCIAL

## 2022-08-04 ENCOUNTER — ANESTHESIA EVENT (OUTPATIENT)
Dept: ENDOSCOPY | Age: 32
End: 2022-08-04
Payer: COMMERCIAL

## 2022-08-04 VITALS
RESPIRATION RATE: 20 BRPM | TEMPERATURE: 98.4 F | OXYGEN SATURATION: 100 % | HEIGHT: 67 IN | DIASTOLIC BLOOD PRESSURE: 88 MMHG | HEART RATE: 86 BPM | SYSTOLIC BLOOD PRESSURE: 109 MMHG | WEIGHT: 117.3 LBS | BODY MASS INDEX: 18.41 KG/M2

## 2022-08-04 LAB
AMYLASE FLD-CCNC: NORMAL U/L
HCG UR QL: NEGATIVE
SPECIMEN SOURCE FLD: NORMAL

## 2022-08-04 PROCEDURE — 81025 URINE PREGNANCY TEST: CPT

## 2022-08-04 PROCEDURE — 76040000007: Performed by: INTERNAL MEDICINE

## 2022-08-04 PROCEDURE — 88112 CYTOPATH CELL ENHANCE TECH: CPT

## 2022-08-04 PROCEDURE — 74011250636 HC RX REV CODE- 250/636: Performed by: NURSE ANESTHETIST, CERTIFIED REGISTERED

## 2022-08-04 PROCEDURE — 76060000032 HC ANESTHESIA 0.5 TO 1 HR: Performed by: INTERNAL MEDICINE

## 2022-08-04 PROCEDURE — 74011000250 HC RX REV CODE- 250: Performed by: NURSE ANESTHETIST, CERTIFIED REGISTERED

## 2022-08-04 PROCEDURE — 74011250636 HC RX REV CODE- 250/636: Performed by: INTERNAL MEDICINE

## 2022-08-04 PROCEDURE — 82150 ASSAY OF AMYLASE: CPT

## 2022-08-04 PROCEDURE — 2709999900 HC NON-CHARGEABLE SUPPLY: Performed by: INTERNAL MEDICINE

## 2022-08-04 PROCEDURE — 77030028690 HC NDL ASPIR ULTRSND BSC -E: Performed by: INTERNAL MEDICINE

## 2022-08-04 RX ORDER — SODIUM CHLORIDE 0.9 % (FLUSH) 0.9 %
5-40 SYRINGE (ML) INJECTION AS NEEDED
Status: DISCONTINUED | OUTPATIENT
Start: 2022-08-04 | End: 2022-08-04 | Stop reason: HOSPADM

## 2022-08-04 RX ORDER — PROPOFOL 10 MG/ML
INJECTION, EMULSION INTRAVENOUS AS NEEDED
Status: DISCONTINUED | OUTPATIENT
Start: 2022-08-04 | End: 2022-08-04 | Stop reason: HOSPADM

## 2022-08-04 RX ORDER — PANCRELIPASE LIPASE, PANCRELIPASE PROTEASE, PANCRELIPASE AMYLASE 5000; 17000; 24000 [USP'U]/1; [USP'U]/1; [USP'U]/1
2 CAPSULE, DELAYED RELEASE ORAL
COMMUNITY

## 2022-08-04 RX ORDER — GUAIFENESIN 100 MG/5ML
81 LIQUID (ML) ORAL DAILY
COMMUNITY

## 2022-08-04 RX ORDER — ATROPINE SULFATE 0.1 MG/ML
0.5 INJECTION INTRAVENOUS
Status: DISCONTINUED | OUTPATIENT
Start: 2022-08-04 | End: 2022-08-04 | Stop reason: HOSPADM

## 2022-08-04 RX ORDER — FLUMAZENIL 0.1 MG/ML
0.2 INJECTION INTRAVENOUS
Status: DISCONTINUED | OUTPATIENT
Start: 2022-08-04 | End: 2022-08-04 | Stop reason: HOSPADM

## 2022-08-04 RX ORDER — NALOXONE HYDROCHLORIDE 0.4 MG/ML
0.4 INJECTION, SOLUTION INTRAMUSCULAR; INTRAVENOUS; SUBCUTANEOUS
Status: DISCONTINUED | OUTPATIENT
Start: 2022-08-04 | End: 2022-08-04 | Stop reason: HOSPADM

## 2022-08-04 RX ORDER — SODIUM CHLORIDE 9 MG/ML
50 INJECTION, SOLUTION INTRAVENOUS CONTINUOUS
Status: DISCONTINUED | OUTPATIENT
Start: 2022-08-04 | End: 2022-08-04 | Stop reason: HOSPADM

## 2022-08-04 RX ORDER — SODIUM CHLORIDE 9 MG/ML
INJECTION, SOLUTION INTRAVENOUS
Status: DISCONTINUED | OUTPATIENT
Start: 2022-08-04 | End: 2022-08-04 | Stop reason: HOSPADM

## 2022-08-04 RX ORDER — LEVOFLOXACIN 5 MG/ML
500 INJECTION, SOLUTION INTRAVENOUS ONCE
Status: DISCONTINUED | OUTPATIENT
Start: 2022-08-04 | End: 2022-08-04 | Stop reason: SDUPTHER

## 2022-08-04 RX ORDER — DEXTROMETHORPHAN/PSEUDOEPHED 2.5-7.5/.8
1.2 DROPS ORAL
Status: DISCONTINUED | OUTPATIENT
Start: 2022-08-04 | End: 2022-08-04 | Stop reason: HOSPADM

## 2022-08-04 RX ORDER — LEVOFLOXACIN 5 MG/ML
500 INJECTION, SOLUTION INTRAVENOUS ONCE
Status: COMPLETED | OUTPATIENT
Start: 2022-08-04 | End: 2022-08-04

## 2022-08-04 RX ORDER — LIDOCAINE HYDROCHLORIDE 20 MG/ML
INJECTION, SOLUTION EPIDURAL; INFILTRATION; INTRACAUDAL; PERINEURAL AS NEEDED
Status: DISCONTINUED | OUTPATIENT
Start: 2022-08-04 | End: 2022-08-04 | Stop reason: HOSPADM

## 2022-08-04 RX ORDER — EPINEPHRINE 0.1 MG/ML
1 INJECTION INTRACARDIAC; INTRAVENOUS
Status: DISCONTINUED | OUTPATIENT
Start: 2022-08-04 | End: 2022-08-04 | Stop reason: HOSPADM

## 2022-08-04 RX ADMIN — PROPOFOL 50 MG: 10 INJECTION, EMULSION INTRAVENOUS at 13:58

## 2022-08-04 RX ADMIN — PROPOFOL 25 MG: 10 INJECTION, EMULSION INTRAVENOUS at 13:55

## 2022-08-04 RX ADMIN — PROPOFOL 25 MG: 10 INJECTION, EMULSION INTRAVENOUS at 13:53

## 2022-08-04 RX ADMIN — PROPOFOL 20 MG: 10 INJECTION, EMULSION INTRAVENOUS at 13:46

## 2022-08-04 RX ADMIN — SODIUM CHLORIDE: 900 INJECTION, SOLUTION INTRAVENOUS at 13:33

## 2022-08-04 RX ADMIN — LEVOFLOXACIN 500 MG: 5 INJECTION, SOLUTION INTRAVENOUS at 13:43

## 2022-08-04 RX ADMIN — LIDOCAINE HYDROCHLORIDE 60 MG: 20 INJECTION, SOLUTION EPIDURAL; INFILTRATION; INTRACAUDAL; PERINEURAL at 13:42

## 2022-08-04 RX ADMIN — PROPOFOL 30 MG: 10 INJECTION, EMULSION INTRAVENOUS at 13:47

## 2022-08-04 RX ADMIN — PROPOFOL 25 MG: 10 INJECTION, EMULSION INTRAVENOUS at 13:56

## 2022-08-04 RX ADMIN — PROPOFOL 20 MG: 10 INJECTION, EMULSION INTRAVENOUS at 13:51

## 2022-08-04 RX ADMIN — PROPOFOL 25 MG: 10 INJECTION, EMULSION INTRAVENOUS at 13:57

## 2022-08-04 RX ADMIN — PROPOFOL 130 MG: 10 INJECTION, EMULSION INTRAVENOUS at 13:42

## 2022-08-04 NOTE — DISCHARGE INSTRUCTIONS
[address] [facility].  [address]  [id number]  1990    DISCOMFORT:  Sore throat- throat lozenges or warm salt water gargle  redness at IV site- apply warm compress to area; if redness or soreness persist- contact your physician  Gaseous discomfort- walking, belching will help relieve any discomfort    DIET  You may eat and drink after you leave. You may resume your regular diet - however -  remember your colon is empty and a heavy meal will produce gas. Avoid these foods:  vegetables, fried / greasy foods, carbonated drinks   You may not drink alcoholic beverages for at least 12 hours    ACTIVITY  You may resume your normal daily activities   Spend the remainder of the day resting -  avoid any strenuous activity. You may not operate a vehicle for 12 hours  You may not engage in an occupation involving machinery or appliances for rest of today  Avoid making any critical decisions for at least 24 hour    CALL M.D. ANY SIGN OF   Increasing pain, nausea, vomiting  Abdominal distension (swelling)  New increased bleeding (oral or rectal)  Fever (chills)  Pain in chest area  Bloody discharge from nose or mouth  Shortness of breath    Follow-up Instructions:   Call Dr. Jack Jama for any questions or problems. If we took a biopsy please call the office within 2 weeks to discuss your pathology results. Telephone # [phone]       ENDOSCOPY FINDINGS:   1. - Hiatal Hernia  2. - Pancreatic Pseudocyst        Post-procedure recommendations:   -Await fluid amylase and cytology results  -Continue current medications  -Follow up with Dr Lili Hare as scheduled  -Strict abstinence from alcohol    Learning About Coronavirus (COVID-19)  Coronavirus (COVID-19): Overview  What is coronavirus (COVID-19)? The coronavirus disease (COVID-19) is caused by a virus.  It is an illness that was first found in [place], [place], in December 2019. It has since spread worldwide. The virus can cause fever, cough, and trouble breathing. In severe cases, it can cause pneumonia and make it hard to breathe without help. It can cause death. Coronaviruses are a large group of viruses. They cause the common cold. They also cause more serious illnesses like Middle East respiratory syndrome (MERS) and severe acute respiratory syndrome (SARS). COVID-19 is caused by a novel coronavirus. That means it's a new type that has not been seen in people before. This virus spreads person-to-person through droplets from coughing and sneezing. It can also spread when you are close to someone who is infected. And it can spread when you touch something that has the virus on it, such as a doorknob or a tabletop. What can you do to protect yourself from coronavirus (COVID-19)? The best way to protect yourself from getting sick is to: Avoid areas where there is an outbreak. Avoid contact with people who may be infected. Wash your hands often with soap or alcohol-based hand sanitizers. Avoid crowds and try to stay at least 6 feet away from other people. Wash your hands often, especially after you cough or sneeze. Use soap and water, and scrub for at least 20 seconds. If soap and water aren't available, use an alcohol-based hand . Avoid touching your mouth, nose, and eyes. What can you do to avoid spreading the virus to others? To help avoid spreading the virus to others:  Cover your mouth with a tissue when you cough or sneeze. Then throw the tissue in the trash. Use a disinfectant to clean things that you touch often. Stay home if you are sick or have been exposed to the virus. Don't go to school, work, or public areas. And don't use public transportation. If you are sick:  Leave your home only if you need to get medical care. But call the doctor's office first so they know you're coming. And wear a face mask, if you have one.   If you have a face mask, wear it whenever you're around other people. It can help stop the spread of the virus when you cough or sneeze. Clean and disinfect your home every day. Use household  and disinfectant wipes or sprays. Take special care to clean things that you grab with your hands. These include doorknobs, remote controls, phones, and handles on your refrigerator and microwave. And don't forget countertops, tabletops, bathrooms, and computer keyboards. When to call for help  Call 911 anytime you think you may need emergency care. For example, call if:  You have severe trouble breathing. (You can't talk at all.)  You have constant chest pain or pressure. You are severely dizzy or lightheaded. You are confused or can't think clearly. Your face and lips have a blue color. You pass out (lose consciousness) or are very hard to wake up. Call your doctor now if you develop symptoms such as:  Shortness of breath. Fever. Cough. If you need to get care, call ahead to the doctor's office for instructions before you go. Make sure you wear a face mask, if you have one, to prevent exposing other people to the virus. Where can you get the latest information? The following health organizations are tracking and studying this virus. Their websites contain the most up-to-date information. Mauricio Finley also learn what to do if you think you may have been exposed to the virus. U.S. Centers for Disease Control and Prevention (CDC): The CDC provides updated news about the disease and travel advice. The website also tells you how to prevent the spread of infection. www.cdc.gov  World Health Organization [facility]): WHO offers information about the virus outbreaks. WHO also has travel advice. www.who.int  Current as of: April 1, 2020               Content Version: 12.4  © 2006-2020 Healthwise, Incorporated.    Care instructions adapted under license by your healthcare professional. If you have questions about a medical condition or this instruction, always ask your healthcare professional. [address] any warranty or liability for your use of this information. Hiatal Hernia: Care Instructions  Your Care Instructions  A hiatal hernia occurs when part of the stomach bulges into the chest cavity. A hiatal hernia may allow stomach acid and juices to back up into the esophagus (acid reflux). This can cause a feeling of burning, warmth, heat, or pain behind the breastbone. This feeling may often occur after you eat, soon after you lie down, or when you bend forward, and it may come and go. You also may have a sour taste in your mouth. These symptoms are commonly known as heartburn or reflux. But not all hiatal hernias cause symptoms. Follow-up care is a key part of your treatment and safety. Be sure to make and go to all appointments, and call your doctor if you are having problems. It's also a good idea to know your test results and keep a list of the medicines you take. How can you care for yourself at home? Take your medicines exactly as prescribed. Call your doctor if you think you are having a problem with your medicine. Do not take aspirin or other nonsteroidal anti-inflammatory drugs (NSAIDs), such as ibuprofen (Advil, Motrin) or naproxen (Aleve), unless your doctor says it is okay. Ask your doctor what you can take for pain. Your doctor may recommend over-the-counter medicine. For mild or occasional indigestion, antacids such as Tums, Gaviscon, Maalox, or Mylanta may help. Your doctor also may recommend over-the-counter acid reducers, such as famotidine (Pepcid AC), cimetidine (Tagamet HB), or omeprazole (Prilosec). Read and follow all instructions on the label. If you use these medicines often, talk with your doctor. Change your eating habits. It's best to eat several small meals instead of two or three large meals. After you eat, wait 2 to 3 hours before you lie down.  Late-night snacks aren't a good idea.  Avoid foods that make your symptoms worse. These may include chocolate, mint, alcohol, pepper, spicy foods, high-fat foods, or drinks with caffeine in them, such as tea, coffee, robbie, or energy drinks. If your symptoms are worse after you eat a certain food, you may want to stop eating it to see if your symptoms get better. Do not smoke or chew tobacco.  If you get heartburn at night, raise the head of your bed 6 to 8 inches by putting the frame on blocks or placing a foam wedge under the head of your mattress. (Adding extra pillows does not work.)  Do not wear tight clothing around your middle. Lose weight if you need to. Losing just 5 to 10 pounds can help. When should you call for help? Call your doctor now or seek immediate medical care if:    You have new or worse belly pain. You are vomiting. Watch closely for changes in your health, and be sure to contact your doctor if:    You have new or worse symptoms of indigestion. You have trouble or pain swallowing. You are losing weight. You do not get better as expected. Where can you learn more? Go to http://www.[employer].com/  Enter T074 in the search box to learn more about \"Hiatal Hernia: Care Instructions. \"  Current as of: September 8, 2021               Content Version: 13.2  © 8367-3111 Healthwise, Incorporated. Care instructions adapted under license by [employer] (which disclaims liability or warranty for this information). If you have questions about a medical condition or this instruction, always ask your healthcare professional. [place] [address] any warranty or liability for your use of this information.

## 2022-08-04 NOTE — PROGRESS NOTES

## 2022-08-04 NOTE — H&P
[address]  [address]  [place], [address]  [phone]                                History and Physical     NAME: Del Harper   :  1990   MRN:  [id number]     HPI:  The patient was seen and examined. Past Surgical History:   Procedure Laterality Date    HX ORTHOPAEDIC      realignment on left knee    HX WISDOM TEETH EXTRACTION      CT REVISION OF UNSTABLE PATELLA      RIGHT     Past Medical History:   Diagnosis Date    Adverse effect of anesthesia     Asthma     Heart murmur     Raynaud disease     Stroke ([address]     UTI (lower urinary tract infection)      Social History     Tobacco Use    Smoking status: Former     Packs/day: 0.25     Types: Cigarettes     Quit date:      Years since quittin.5    Smokeless tobacco: Never   Substance Use Topics    Alcohol use: Not Currently    Drug use: No     Allergies   Allergen Reactions    Imitrex [Sumatriptan Succinate] Palpitations    Imitrex [Sumatriptan] Palpitations    Robaxin [Methocarbamol] Vertigo     Family History   Problem Relation Age of Onset    No Known Problems Mother     No Known Problems Father     Cancer Neg Hx         no known history but limited knowledge     Current Facility-Administered Medications   Medication Dose Route Frequency    0.9% sodium chloride infusion  50 mL/hr IntraVENous CONTINUOUS    sodium chloride (NS) flush 5-40 mL  5-40 mL IntraVENous PRN    naloxone (NARCAN) injection 0.4 mg  0.4 mg IntraVENous Multiple    flumazeniL (ROMAZICON) 0.1 mg/mL injection 0.2 mg  0.2 mg IntraVENous Multiple    simethicone (MYLICON) 56NT/6.7SS oral drops 80 mg  1.2 mL Oral Multiple    atropine injection 0.5 mg  0.5 mg IntraVENous ONCE PRN    EPINEPHrine (ADRENALIN) 0.1 mg/mL syringe 1 mg  1 mg Endoscopically ONCE PRN         PHYSICAL EXAM:  General: WD, WN. Alert, cooperative, no acute distress    HEENT: NC, Atraumatic. PERRLA, EOMI. Anicteric sclerae. Lungs:  CTA Bilaterally.  No Wheezing/Rhonchi/Rales. Heart:  Regular  rhythm,  No murmur, No Rubs, No Gallops  Abdomen: Soft, Non distended, Non tender. +Bowel sounds, no HSM  Extremities: No c/c/e  Neurologic:  CN 2-12 gi, Alert and oriented X 3. No acute neurological distress   Psych:   Good insight. Not anxious nor agitated. The heart, lungs and mental status were satisfactory for the administration of MAC sedation and for the procedure. Mallampati score: 2     The patient was counseled at length about the risks of michael Covid-19 in the raji-operative and post-operative states including the recovery window of their procedure. The patient was made aware that michael Covid-19 after a surgical procedure may worsen their prognosis for recovering from the virus and lend to a higher morbidity and or mortality risk. The patient was given the options of postponing their procedure. All of the risks, benefits, and alternatives were discussed. The patient does  wish to proceed with the procedure.       Assessment:   Pancreatic pseudocyst    Plan:   Endoscopic procedure  MAC sedation

## 2022-08-04 NOTE — PROCEDURES
[address] [facility].  [address] [facility] [address], [address]  [phone]                                Endoscopic Ultrasound    NAME:  Marlin Henry   :   1990   MRN:   [id number]       Date/Time:  2022   Procedure Type: Linear Upper EUS with pseudocyst drainage      Indications: Pancreatic pseudocyst    Pre-operative Diagnosis: see indication above    Post-operative Diagnosis:  See findings below    : Prasad Owen MD    Surgical Assistant: Endoscopy Technician-1: Baljit Cornell  Endoscopy RN-1: Bryanna Copeland RN    Implants: none    Referring Provider: Angeles Peguero MD -Britta Ross DO    Anethesia/Sedation:  MAC anesthesia      Procedure Details   After infom consent was obtained for the procedure, with all risks and benefits of procedure explained the patient was taken to the endoscopy suite and placed in the left lateral decubitus position. Following sequential administration of sedation as per above, the linear echoendoscope was inserted into the mouth and advanced under direct vision to second portion of the duodenum. A careful inspection was made as the gastroscope was withdrawn, including a retroflexed view of the proximal stomach; findings and interventions are described below. Findings:     Endoscopic:   Esophagus: Normal mucosa esophagus. Small sliding hiatal hernia was noted with Z-line at 38 cm and diaphragmatic pinch at 40 cm. Stomach: normal    Duodenum/jejunum: normal    Ultrasound:   Esophagus: not examined   Stomach: not examined   Pancreas:     Areas examined: entire pancreas    Parenchyma: -Coarse echogenicity with few scattered echogenic strands and foci were noted in the entire pancreas. Mild irregularity and tortuosity of the main pancreatic duct was noted in the entire pancreas. Pseudocyst with well-defined wall was noted adjacent to the neck of the pancreas and measured about 39 mm by 38 mm in size.   No clear communication with the pancreatic duct was noted. Liver:     Parenchyma: not examined    Gallbladder:  surgically absent    Bile Duct: the common bile duct was normal               Lymph Node: no adenopathy        Specimen Removed:   Pancreatic cyst fluid    Complications: None. EBL:  None. Interventions: cyst was punctured using 19-gauge FNA needle under guidance of Doppler. No vascular structures were intervening. Fine Needle aspirate 70 cc clear was performed of the pancreas  using a 19 gauge needle with 1 of passes with preliminary results suggesting indetermined.       Recommendations:   -Await fluid amylase and cytology results  -Continue current medications  -Follow up with Dr Lili Hare as scheduled  -Strict abstinence from alcohol    Everton Pineda MD  8/4/2022  2:05 PM

## 2022-08-04 NOTE — ANESTHESIA PREPROCEDURE EVALUATION
Relevant Problems   No relevant active problems       Anesthetic History   No history of anesthetic complications            Review of Systems / Medical History  Patient summary reviewed, nursing notes reviewed and pertinent labs reviewed    Pulmonary  Within defined limits          Asthma        Neuro/Psych       CVA  TIA    Comments: Left sided weakness Cardiovascular                  Exercise tolerance: >4 METS     GI/Hepatic/Renal                Endo/Other             Other Findings              Physical Exam    Airway  Mallampati: II  TM Distance: > 6 cm  Neck ROM: normal range of motion   Mouth opening: Normal     Cardiovascular    Rhythm: regular           Dental  No notable dental hx       Pulmonary  Breath sounds clear to auscultation               Abdominal         Other Findings            Anesthetic Plan    ASA: 2  Anesthesia type: MAC          Induction: Intravenous  Anesthetic plan and risks discussed with: Patient

## 2022-08-05 NOTE — ANESTHESIA POSTPROCEDURE EVALUATION
Post-Anesthesia Evaluation and Assessment    Patient: Amber Mak MRN: [id number]  SSN: [id number]    YOB: 1990  Age: 32 y.o. Sex: female      I have evaluated the patient and they are stable and ready for discharge from the PACU. Cardiovascular Function/Vital Signs  Visit Vitals  /88   Pulse 86   Temp 36.9 °C (98.4 °F)   Resp 20   Ht 5' 7.25\" (1.708 m)   Wt 53.2 kg (117 lb 4.8 oz)   SpO2 100%   Breastfeeding No   BMI 18.24 kg/m²       Patient is status post MAC anesthesia for Procedure(s):  LINEAR ENDOSCOPIC ULTRASOUND (EUS)  ESOPHAGOGASTRODUODENOSCOPY (EGD)  FINE NEEDLE ASPIRATION. Nausea/Vomiting: None    Postoperative hydration reviewed and adequate. Pain:  Pain Scale 1: Numeric (0 - 10) (08/04/22 1420)  Pain Intensity 1: 0 (08/04/22 1420)   Managed    Neurological Status: At baseline    Mental Status, Level of Consciousness: Alert and  oriented to person, place, and time    Pulmonary Status:   O2 Device: None (Room air) (08/04/22 1420)   Adequate oxygenation and airway patent    Complications related to anesthesia: None    Post-anesthesia assessment completed. No concerns    Signed By: Germania Begum MD     August 5, 2022              Procedure(s):  LINEAR ENDOSCOPIC ULTRASOUND (EUS)  ESOPHAGOGASTRODUODENOSCOPY (EGD)  FINE NEEDLE ASPIRATION. MAC    <[email or web address]>    INITIAL Post-op Vital signs:   Vitals Value Taken Time   /89 08/04/22 1430   Temp 36.9 °C (98.4 °F) 08/04/22 1411   Pulse 79 08/04/22 1430   Resp 27 08/04/22 1431   SpO2 97 % 08/04/22 1431   Vitals shown include unvalidated device data.

## 2022-10-17 ENCOUNTER — APPOINTMENT (OUTPATIENT)
Dept: CT IMAGING | Age: 32
End: 2022-10-17
Attending: EMERGENCY MEDICINE
Payer: COMMERCIAL

## 2022-10-17 ENCOUNTER — HOSPITAL ENCOUNTER (EMERGENCY)
Age: 32
Discharge: HOME | End: 2022-10-18
Attending: EMERGENCY MEDICINE
Payer: COMMERCIAL

## 2022-10-17 DIAGNOSIS — K86.1 CHRONIC PANCREATITIS, UNSPECIFIED PANCREATITIS TYPE (HCC): Primary | ICD-10-CM

## 2022-10-17 LAB
ALBUMIN SERPL-MCNC: 4 G/DL (ref 3.5–5)
ALBUMIN/GLOB SERPL: 1 {RATIO} (ref 1.1–2.2)
ALP SERPL-CCNC: 97 U/L (ref 45–117)
ALT SERPL-CCNC: 13 U/L (ref 12–78)
ANION GAP SERPL CALC-SCNC: 2 MMOL/L (ref 5–15)
AST SERPL-CCNC: 7 U/L (ref 15–37)
BASOPHILS # BLD: 0.1 K/UL (ref 0–0.1)
BASOPHILS NFR BLD: 1 % (ref 0–1)
BILIRUB SERPL-MCNC: 0.1 MG/DL (ref 0.2–1)
BUN SERPL-MCNC: 6 MG/DL (ref 6–20)
BUN/CREAT SERPL: 8 (ref 12–20)
CALCIUM SERPL-MCNC: 9 MG/DL (ref 8.5–10.1)
CHLORIDE SERPL-SCNC: 106 MMOL/L (ref 97–108)
CO2 SERPL-SCNC: 27 MMOL/L (ref 21–32)
COMMENT, HOLDF: NORMAL
CREAT SERPL-MCNC: 0.77 MG/DL (ref 0.55–1.02)
DIFFERENTIAL METHOD BLD: ABNORMAL
EOSINOPHIL # BLD: 0.2 K/UL (ref 0–0.4)
EOSINOPHIL NFR BLD: 2 % (ref 0–7)
ERYTHROCYTE [DISTWIDTH] IN BLOOD BY AUTOMATED COUNT: 14.4 % (ref 11.5–14.5)
GLOBULIN SER CALC-MCNC: 4 G/DL (ref 2–4)
GLUCOSE SERPL-MCNC: 87 MG/DL (ref 65–100)
HCG SERPL QL: NEGATIVE
HCG UR QL: NEGATIVE
HCT VFR BLD AUTO: 41.5 % (ref 35–47)
HGB BLD-MCNC: 13.5 G/DL (ref 11.5–16)
IMM GRANULOCYTES # BLD AUTO: 0 K/UL (ref 0–0.04)
IMM GRANULOCYTES NFR BLD AUTO: 0 % (ref 0–0.5)
INR PPP: 1 (ref 0.9–1.1)
LIPASE SERPL-CCNC: 237 U/L (ref 73–393)
LYMPHOCYTES # BLD: 3.3 K/UL (ref 0.8–3.5)
LYMPHOCYTES NFR BLD: 36 % (ref 12–49)
MCH RBC QN AUTO: 27.7 PG (ref 26–34)
MCHC RBC AUTO-ENTMCNC: 32.5 G/DL (ref 30–36.5)
MCV RBC AUTO: 85.2 FL (ref 80–99)
MONOCYTES # BLD: 0.6 K/UL (ref 0–1)
MONOCYTES NFR BLD: 6 % (ref 5–13)
NEUTS SEG # BLD: 5.1 K/UL (ref 1.8–8)
NEUTS SEG NFR BLD: 55 % (ref 32–75)
NRBC # BLD: 0 K/UL (ref 0–0.01)
NRBC BLD-RTO: 0 PER 100 WBC
PLATELET # BLD AUTO: 466 K/UL (ref 150–400)
PMV BLD AUTO: 8.7 FL (ref 8.9–12.9)
POTASSIUM SERPL-SCNC: 4.3 MMOL/L (ref 3.5–5.1)
PROT SERPL-MCNC: 8 G/DL (ref 6.4–8.2)
PROTHROMBIN TIME: 10.5 SEC (ref 9–11.1)
RBC # BLD AUTO: 4.87 M/UL (ref 3.8–5.2)
SAMPLES BEING HELD,HOLD: NORMAL
SODIUM SERPL-SCNC: 135 MMOL/L (ref 136–145)
WBC # BLD AUTO: 9.2 K/UL (ref 3.6–11)

## 2022-10-17 PROCEDURE — 80053 COMPREHEN METABOLIC PANEL: CPT

## 2022-10-17 PROCEDURE — 36415 COLL VENOUS BLD VENIPUNCTURE: CPT

## 2022-10-17 PROCEDURE — 96375 TX/PRO/DX INJ NEW DRUG ADDON: CPT

## 2022-10-17 PROCEDURE — 84703 CHORIONIC GONADOTROPIN ASSAY: CPT

## 2022-10-17 PROCEDURE — 74177 CT ABD & PELVIS W/CONTRAST: CPT

## 2022-10-17 PROCEDURE — 74011000636 HC RX REV CODE- 636: Performed by: INTERNAL MEDICINE

## 2022-10-17 PROCEDURE — 83690 ASSAY OF LIPASE: CPT

## 2022-10-17 PROCEDURE — 85610 PROTHROMBIN TIME: CPT

## 2022-10-17 PROCEDURE — 81025 URINE PREGNANCY TEST: CPT

## 2022-10-17 PROCEDURE — 74011250636 HC RX REV CODE- 250/636: Performed by: EMERGENCY MEDICINE

## 2022-10-17 PROCEDURE — 85025 COMPLETE CBC W/AUTO DIFF WBC: CPT

## 2022-10-17 PROCEDURE — 96376 TX/PRO/DX INJ SAME DRUG ADON: CPT

## 2022-10-17 PROCEDURE — 99285 EMERGENCY DEPT VISIT HI MDM: CPT

## 2022-10-17 PROCEDURE — 81001 URINALYSIS AUTO W/SCOPE: CPT

## 2022-10-17 PROCEDURE — 96374 THER/PROPH/DIAG INJ IV PUSH: CPT

## 2022-10-17 RX ORDER — HYDROMORPHONE HYDROCHLORIDE 1 MG/ML
1 INJECTION, SOLUTION INTRAMUSCULAR; INTRAVENOUS; SUBCUTANEOUS
Status: COMPLETED | OUTPATIENT
Start: 2022-10-17 | End: 2022-10-17

## 2022-10-17 RX ORDER — ONDANSETRON 2 MG/ML
4 INJECTION INTRAMUSCULAR; INTRAVENOUS ONCE
Status: COMPLETED | OUTPATIENT
Start: 2022-10-17 | End: 2022-10-17

## 2022-10-17 RX ORDER — ONDANSETRON 2 MG/ML
4 INJECTION INTRAMUSCULAR; INTRAVENOUS
Status: COMPLETED | OUTPATIENT
Start: 2022-10-17 | End: 2022-10-18

## 2022-10-17 RX ADMIN — HYDROMORPHONE HYDROCHLORIDE 1 MG: 1 INJECTION, SOLUTION INTRAMUSCULAR; INTRAVENOUS; SUBCUTANEOUS at 20:58

## 2022-10-17 RX ADMIN — ONDANSETRON 4 MG: 2 INJECTION INTRAMUSCULAR; INTRAVENOUS at 20:58

## 2022-10-17 RX ADMIN — IOPAMIDOL 100 ML: 755 INJECTION, SOLUTION INTRAVENOUS at 23:53

## 2022-10-17 RX ADMIN — HYDROMORPHONE HYDROCHLORIDE 1 MG: 1 INJECTION, SOLUTION INTRAMUSCULAR; INTRAVENOUS; SUBCUTANEOUS at 23:40

## 2022-10-17 RX ADMIN — HYDROMORPHONE HYDROCHLORIDE 1 MG: 1 INJECTION, SOLUTION INTRAMUSCULAR; INTRAVENOUS; SUBCUTANEOUS at 21:52

## 2022-10-17 RX ADMIN — SODIUM CHLORIDE 1000 ML: 9 INJECTION, SOLUTION INTRAVENOUS at 20:58

## 2022-10-17 NOTE — ED TRIAGE NOTES
She reports abdominal pain which is her pain with pancreatitis. She had cardiac surgery for a PFO two weeks ago. She also is having dark stools. She is taking Plavix since her surgery.

## 2022-10-18 ENCOUNTER — HOSPITAL ENCOUNTER (EMERGENCY)
Age: 32
Discharge: HOME | End: 2022-10-19
Attending: STUDENT IN AN ORGANIZED HEALTH CARE EDUCATION/TRAINING PROGRAM
Payer: COMMERCIAL

## 2022-10-18 VITALS
TEMPERATURE: 97.3 F | SYSTOLIC BLOOD PRESSURE: 118 MMHG | HEART RATE: 101 BPM | RESPIRATION RATE: 12 BRPM | OXYGEN SATURATION: 100 % | DIASTOLIC BLOOD PRESSURE: 89 MMHG

## 2022-10-18 DIAGNOSIS — K86.1 CHRONIC PANCREATITIS, UNSPECIFIED PANCREATITIS TYPE (HCC): Primary | ICD-10-CM

## 2022-10-18 LAB
ALBUMIN SERPL-MCNC: 4.1 G/DL (ref 3.5–5)
ALBUMIN/GLOB SERPL: 1 {RATIO} (ref 1.1–2.2)
ALP SERPL-CCNC: 120 U/L (ref 45–117)
ALT SERPL-CCNC: 14 U/L (ref 12–78)
ANION GAP SERPL CALC-SCNC: 3 MMOL/L (ref 5–15)
APPEARANCE UR: CLEAR
AST SERPL-CCNC: 8 U/L (ref 15–37)
BACTERIA URNS QL MICRO: NEGATIVE /HPF
BASOPHILS # BLD: 0.1 K/UL (ref 0–0.1)
BASOPHILS NFR BLD: 1 % (ref 0–1)
BILIRUB SERPL-MCNC: 0.2 MG/DL (ref 0.2–1)
BILIRUB UR QL: NEGATIVE
BUN SERPL-MCNC: 3 MG/DL (ref 6–20)
BUN/CREAT SERPL: 4 (ref 12–20)
CALCIUM SERPL-MCNC: 9.2 MG/DL (ref 8.5–10.1)
CHLORIDE SERPL-SCNC: 106 MMOL/L (ref 97–108)
CO2 SERPL-SCNC: 28 MMOL/L (ref 21–32)
COLOR UR: ABNORMAL
COMMENT, HOLDF: NORMAL
CREAT SERPL-MCNC: 0.8 MG/DL (ref 0.55–1.02)
DIFFERENTIAL METHOD BLD: ABNORMAL
EOSINOPHIL # BLD: 0.2 K/UL (ref 0–0.4)
EOSINOPHIL NFR BLD: 2 % (ref 0–7)
EPITH CASTS URNS QL MICRO: ABNORMAL /LPF
ERYTHROCYTE [DISTWIDTH] IN BLOOD BY AUTOMATED COUNT: 14.3 % (ref 11.5–14.5)
GLOBULIN SER CALC-MCNC: 4.3 G/DL (ref 2–4)
GLUCOSE SERPL-MCNC: 87 MG/DL (ref 65–100)
GLUCOSE UR STRIP.AUTO-MCNC: NEGATIVE MG/DL
HCT VFR BLD AUTO: 40.7 % (ref 35–47)
HGB BLD-MCNC: 13.4 G/DL (ref 11.5–16)
HGB UR QL STRIP: ABNORMAL
HYALINE CASTS URNS QL MICRO: ABNORMAL /LPF (ref 0–5)
IMM GRANULOCYTES # BLD AUTO: 0 K/UL (ref 0–0.04)
IMM GRANULOCYTES NFR BLD AUTO: 0 % (ref 0–0.5)
KETONES UR QL STRIP.AUTO: NEGATIVE MG/DL
LEUKOCYTE ESTERASE UR QL STRIP.AUTO: NEGATIVE
LIPASE SERPL-CCNC: 240 U/L (ref 73–393)
LYMPHOCYTES # BLD: 3 K/UL (ref 0.8–3.5)
LYMPHOCYTES NFR BLD: 32 % (ref 12–49)
MCH RBC QN AUTO: 28 PG (ref 26–34)
MCHC RBC AUTO-ENTMCNC: 32.9 G/DL (ref 30–36.5)
MCV RBC AUTO: 85 FL (ref 80–99)
MONOCYTES # BLD: 0.6 K/UL (ref 0–1)
MONOCYTES NFR BLD: 6 % (ref 5–13)
NEUTS SEG # BLD: 5.7 K/UL (ref 1.8–8)
NEUTS SEG NFR BLD: 59 % (ref 32–75)
NITRITE UR QL STRIP.AUTO: NEGATIVE
NRBC # BLD: 0 K/UL (ref 0–0.01)
NRBC BLD-RTO: 0 PER 100 WBC
PH UR STRIP: 6.5 [PH] (ref 5–8)
PLATELET # BLD AUTO: 551 K/UL (ref 150–400)
PMV BLD AUTO: 8.9 FL (ref 8.9–12.9)
POTASSIUM SERPL-SCNC: 3.8 MMOL/L (ref 3.5–5.1)
PROT SERPL-MCNC: 8.4 G/DL (ref 6.4–8.2)
PROT UR STRIP-MCNC: NEGATIVE MG/DL
RBC # BLD AUTO: 4.79 M/UL (ref 3.8–5.2)
RBC #/AREA URNS HPF: >100 /HPF (ref 0–5)
SAMPLES BEING HELD,HOLD: NORMAL
SODIUM SERPL-SCNC: 137 MMOL/L (ref 136–145)
SP GR UR REFRACTOMETRY: 1.01 (ref 1–1.03)
UA: UC IF INDICATED,UAUC: ABNORMAL
UR CULT HOLD, URHOLD: NORMAL
UROBILINOGEN UR QL STRIP.AUTO: 0.2 EU/DL (ref 0.2–1)
WBC # BLD AUTO: 9.6 K/UL (ref 3.6–11)
WBC URNS QL MICRO: ABNORMAL /HPF (ref 0–4)

## 2022-10-18 PROCEDURE — 96374 THER/PROPH/DIAG INJ IV PUSH: CPT

## 2022-10-18 PROCEDURE — 74011250636 HC RX REV CODE- 250/636: Performed by: STUDENT IN AN ORGANIZED HEALTH CARE EDUCATION/TRAINING PROGRAM

## 2022-10-18 PROCEDURE — 83690 ASSAY OF LIPASE: CPT

## 2022-10-18 PROCEDURE — 36415 COLL VENOUS BLD VENIPUNCTURE: CPT

## 2022-10-18 PROCEDURE — 80053 COMPREHEN METABOLIC PANEL: CPT

## 2022-10-18 PROCEDURE — 85025 COMPLETE CBC W/AUTO DIFF WBC: CPT

## 2022-10-18 PROCEDURE — 99284 EMERGENCY DEPT VISIT MOD MDM: CPT

## 2022-10-18 PROCEDURE — 81001 URINALYSIS AUTO W/SCOPE: CPT

## 2022-10-18 PROCEDURE — 96375 TX/PRO/DX INJ NEW DRUG ADDON: CPT

## 2022-10-18 PROCEDURE — 96361 HYDRATE IV INFUSION ADD-ON: CPT

## 2022-10-18 RX ORDER — ONDANSETRON 2 MG/ML
4 INJECTION INTRAMUSCULAR; INTRAVENOUS ONCE
Status: COMPLETED | OUTPATIENT
Start: 2022-10-18 | End: 2022-10-18

## 2022-10-18 RX ORDER — HYDROMORPHONE HYDROCHLORIDE 1 MG/ML
1 INJECTION, SOLUTION INTRAMUSCULAR; INTRAVENOUS; SUBCUTANEOUS
Status: COMPLETED | OUTPATIENT
Start: 2022-10-19 | End: 2022-10-19

## 2022-10-18 RX ORDER — ONDANSETRON 4 MG/1
4 TABLET, ORALLY DISINTEGRATING ORAL
Qty: 20 TABLET | Refills: 0 | Status: SHIPPED | OUTPATIENT
Start: 2022-10-18

## 2022-10-18 RX ORDER — HYDROMORPHONE HYDROCHLORIDE 1 MG/ML
1 INJECTION, SOLUTION INTRAMUSCULAR; INTRAVENOUS; SUBCUTANEOUS ONCE
Status: COMPLETED | OUTPATIENT
Start: 2022-10-18 | End: 2022-10-18

## 2022-10-18 RX ADMIN — ONDANSETRON 4 MG: 2 INJECTION INTRAMUSCULAR; INTRAVENOUS at 23:50

## 2022-10-18 RX ADMIN — HYDROMORPHONE HYDROCHLORIDE 1 MG: 1 INJECTION, SOLUTION INTRAMUSCULAR; INTRAVENOUS; SUBCUTANEOUS at 00:59

## 2022-10-18 RX ADMIN — ONDANSETRON 4 MG: 2 INJECTION INTRAMUSCULAR; INTRAVENOUS at 00:20

## 2022-10-18 RX ADMIN — HYDROMORPHONE HYDROCHLORIDE 1 MG: 1 INJECTION, SOLUTION INTRAMUSCULAR; INTRAVENOUS; SUBCUTANEOUS at 23:50

## 2022-10-18 RX ADMIN — SODIUM CHLORIDE 1000 ML: 9 INJECTION, SOLUTION INTRAVENOUS at 23:49

## 2022-10-18 NOTE — ED NOTES
12:52 AM  Received signout on patient. CT scan without any acute findings, patient already aware of cyst.  She is going to call her GI doctor in the morning. She is feeling significantly better, ready for discharge, strict return precautions given.     Signed By: Neftali Watson MD     October 18, 2022

## 2022-10-18 NOTE — ED PROVIDER NOTES
20-year-old female with history of PFO with recent surgery 2 weeks ago currently on anticoagulation (projected for the next 3 months) with recurrent pancreatitis, thought to be due to alcohol abuse although she does not report ever being a heavy drinker, presents to the emergency department with a chief complaint of abdominal pain consistent with prior pancreatitis. She also reports dark stools concerning for GI bleeding. Abdominal Pain   Associated symptoms include melena. Pertinent negatives include no fever, no diarrhea, no nausea, no vomiting, no dysuria, no headaches, no arthralgias, no myalgias and no chest pain. Melena   Associated symptoms include abdominal pain. Pertinent negatives include no fever, no diarrhea, no nausea, no vomiting, no chest pain, no headaches, no coughing and no rash.       Past Medical History:   Diagnosis Date    Adverse effect of anesthesia     Asthma     Heart murmur     PFO (patent foramen ovale)     Raynaud disease     Stroke [facility])     UTI (lower urinary tract infection)        Past Surgical History:   Procedure Laterality Date    HX ORTHOPAEDIC      realignment on left knee    HX WISDOM TEETH EXTRACTION      [place] CARDIAC SURG PROCEDURE UNLIST      [place] REVISION OF UNSTABLE PATELLA      RIGHT         Family History:   Problem Relation Age of Onset    No Known Problems Mother     No Known Problems Father     Cancer Neg Hx         no known history but limited knowledge       Social History     Socioeconomic History    Marital status:      Spouse name: Not on file    Number of children: Not on file    Years of education: Not on file    Highest education level: Not on file   Occupational History    Not on file   Tobacco Use    Smoking status: Former     Packs/day: 0.25     Types: Cigarettes     Quit date:      Years since quittin.7    Smokeless tobacco: Never   Substance and Sexual Activity    Alcohol use: Not Currently    Drug use: No    Sexual activity: Yes   Other Topics Concern    Not on file   Social History Narrative    No known chemical exposures. Social Determinants of Health     Financial Resource Strain: Not on file   Food Insecurity: Not on file   Transportation Needs: Not on file   Physical Activity: Not on file   Stress: Not on file   Social Connections: Not on file   Intimate Partner Violence: Not on file   Housing Stability: Not on file         ALLERGIES: Imitrex [sumatriptan succinate], Imitrex [sumatriptan], and Robaxin [methocarbamol]    Review of Systems   Constitutional:  Negative for fatigue and fever. HENT:  Negative for sneezing and sore throat. Respiratory:  Negative for cough and shortness of breath. Cardiovascular:  Negative for chest pain and leg swelling. Gastrointestinal:  Positive for abdominal pain and melena. Negative for diarrhea, nausea and vomiting. Genitourinary:  Negative for difficulty urinating and dysuria. Musculoskeletal:  Negative for arthralgias and myalgias. Skin:  Negative for color change and rash. Neurological:  Negative for weakness and headaches. Psychiatric/Behavioral:  Negative for agitation and behavioral problems. Vitals:    10/17/22 1942 10/17/22 2032   BP: 130/84 (!) 115/90   Pulse: (!) 130 (!) 119   Resp: 18 18   Temp: 97.8 °F (36.6 °C) 97.8 °F (36.6 °C)   SpO2: 100% 98%            Physical Exam  Vitals and nursing note reviewed. Constitutional:       General: She is not in acute distress. Appearance: Normal appearance. She is well-developed. She is not ill-appearing, toxic-appearing or diaphoretic. HENT:      Head: Normocephalic and atraumatic. Nose: Nose normal.      Mouth/Throat:      Mouth: Mucous membranes are moist.      Pharynx: Oropharynx is clear. Eyes:      Extraocular Movements: Extraocular movements intact. Conjunctiva/sclera: Conjunctivae normal.      Pupils: Pupils are equal, round, and reactive to light. Cardiovascular:      Rate and Rhythm: Regular rhythm. Tachycardia present. Pulses: Normal pulses. Heart sounds: Normal heart sounds. Pulmonary:      Effort: Pulmonary effort is normal. No respiratory distress. Breath sounds: Normal breath sounds. No wheezing. Chest:      Chest wall: No tenderness. Abdominal:      General: Abdomen is flat. There is no distension. Palpations: Abdomen is soft. Tenderness: There is abdominal tenderness in the epigastric area. There is no guarding or rebound. Musculoskeletal:         General: No swelling, tenderness, deformity or signs of injury. Normal range of motion. Cervical back: Normal range of motion and neck supple. No rigidity. No muscular tenderness. Right lower leg: No edema. Left lower leg: No edema. Skin:     General: Skin is warm and dry. Capillary Refill: Capillary refill takes less than 2 seconds. Neurological:      General: No focal deficit present. Mental Status: She is alert and oriented to person, place, and time. Psychiatric:         Mood and Affect: Mood normal.         Behavior: Behavior normal.        MDM  Number of Diagnoses or Management Options  Diagnosis management comments: 19-year-old female presents as above with abdominal pain with epigastric tenderness. Potentially the current pancreatitis. Turned over to oncoming physician pending work-up. Amount and/or Complexity of Data Reviewed  Clinical lab tests: reviewed           Procedures                 9:38 PM  Change of shift. Care of patient signed over to Dr. Laly Martin. Handoff complete.

## 2022-10-19 VITALS
HEART RATE: 104 BPM | DIASTOLIC BLOOD PRESSURE: 85 MMHG | SYSTOLIC BLOOD PRESSURE: 125 MMHG | RESPIRATION RATE: 16 BRPM | OXYGEN SATURATION: 99 % | TEMPERATURE: 97.6 F

## 2022-10-19 LAB
APPEARANCE UR: CLEAR
BACTERIA URNS QL MICRO: NEGATIVE /HPF
BILIRUB UR QL: NEGATIVE
COLOR UR: ABNORMAL
EPITH CASTS URNS QL MICRO: ABNORMAL /LPF
GLUCOSE UR STRIP.AUTO-MCNC: NEGATIVE MG/DL
HGB UR QL STRIP: ABNORMAL
HYALINE CASTS URNS QL MICRO: ABNORMAL /LPF (ref 0–5)
KETONES UR QL STRIP.AUTO: NEGATIVE MG/DL
LEUKOCYTE ESTERASE UR QL STRIP.AUTO: NEGATIVE
NITRITE UR QL STRIP.AUTO: NEGATIVE
PH UR STRIP: 8 [PH] (ref 5–8)
PROT UR STRIP-MCNC: NEGATIVE MG/DL
RBC #/AREA URNS HPF: ABNORMAL /HPF (ref 0–5)
SP GR UR REFRACTOMETRY: 1.01 (ref 1–1.03)
UROBILINOGEN UR QL STRIP.AUTO: 0.2 EU/DL (ref 0.2–1)
WBC URNS QL MICRO: ABNORMAL /HPF (ref 0–4)

## 2022-10-19 PROCEDURE — 74011250636 HC RX REV CODE- 250/636: Performed by: STUDENT IN AN ORGANIZED HEALTH CARE EDUCATION/TRAINING PROGRAM

## 2022-10-19 PROCEDURE — 96361 HYDRATE IV INFUSION ADD-ON: CPT

## 2022-10-19 PROCEDURE — 96376 TX/PRO/DX INJ SAME DRUG ADON: CPT

## 2022-10-19 RX ORDER — HYDROMORPHONE HYDROCHLORIDE 1 MG/ML
1 INJECTION, SOLUTION INTRAMUSCULAR; INTRAVENOUS; SUBCUTANEOUS ONCE
Status: COMPLETED | OUTPATIENT
Start: 2022-10-19 | End: 2022-10-19

## 2022-10-19 RX ADMIN — HYDROMORPHONE HYDROCHLORIDE 1 MG: 1 INJECTION, SOLUTION INTRAMUSCULAR; INTRAVENOUS; SUBCUTANEOUS at 00:22

## 2022-10-19 RX ADMIN — HYDROMORPHONE HYDROCHLORIDE 1 MG: 1 INJECTION, SOLUTION INTRAMUSCULAR; INTRAVENOUS; SUBCUTANEOUS at 00:51

## 2022-10-19 RX ADMIN — HYDROMORPHONE HYDROCHLORIDE 1 MG: 1 INJECTION, SOLUTION INTRAMUSCULAR; INTRAVENOUS; SUBCUTANEOUS at 02:02

## 2022-10-19 NOTE — ED TRIAGE NOTES
Triage: Pt arrives from home with CC of abdominal pain and headache. Pt reports hx of chronic pancreatitis. Seen yesterday and lipase was 237. Pt reports pain is worse than yesterday. She has a hx of pancreatic cyst that she is scheduled to have removed by Dr. Maria Guadalupe Myers in 2 weeks.

## 2022-10-19 NOTE — ED PROVIDER NOTES
26-year-old female with history of chronic pancreatitis presents to the ED with chief complaint of several days of worsening epigastric abdominal pain. Pain radiates to her back. Patient reports associated nausea and vomiting, unable to tolerate p.o. intake. Patient seen in ED yesterday for same, received multiple doses of Dilaudid and nausea medication, felt better and was able to be discharged. Patient has p.o. Dilaudid at home but has been unable to keep it down, has been using Zofran and Phenergan without relief of her nausea. She had a CT scan at that time that was negative and had reassuring labs. She has known pancreatic pseudocyst that she says is scheduled to be drained in 2 weeks. Unknown cause of her pancreatitis, denies alcohol use. No fevers, chills, chest pain, urinary symptoms, bowel symptoms. The history is provided by the patient. Abdominal Pain   Associated symptoms include nausea, vomiting and headaches. Pertinent negatives include no fever, no diarrhea, no constipation, no dysuria, no hematuria, no chest pain and no back pain. Headache   Associated symptoms include nausea and vomiting. Pertinent negatives include no fever, no shortness of breath, no weakness and no dizziness.       Past Medical History:   Diagnosis Date    Adverse effect of anesthesia     Asthma     Heart murmur     PFO (patent foramen ovale)     Raynaud disease     Stroke ([address]     UTI (lower urinary tract infection)        Past Surgical History:   Procedure Laterality Date    HX ORTHOPAEDIC      realignment on left knee    HX WISDOM TEETH EXTRACTION      [place] CARDIAC SURG PROCEDURE UNLIST      [place] REVISION OF UNSTABLE PATELLA      RIGHT         Family History:   Problem Relation Age of Onset    No Known Problems Mother     No Known Problems Father     Cancer Neg Hx         no known history but limited knowledge       Social History     Socioeconomic History    Marital status:      Spouse name: Not on file    Number of children: Not on file    Years of education: Not on file    Highest education level: Not on file   Occupational History    Not on file   Tobacco Use    Smoking status: Former     Packs/day: 0.25     Types: Cigarettes     Quit date:      Years since quittin.7    Smokeless tobacco: Never   Substance and Sexual Activity    Alcohol use: Not Currently    Drug use: No    Sexual activity: Yes   Other Topics Concern    Not on file   Social History Narrative    No known chemical exposures. Social Determinants of Health     Financial Resource Strain: Not on file   Food Insecurity: Not on file   Transportation Needs: Not on file   Physical Activity: Not on file   Stress: Not on file   Social Connections: Not on file   Intimate Partner Violence: Not on file   Housing Stability: Not on file         ALLERGIES: Imitrex [sumatriptan succinate], Imitrex [sumatriptan], and Robaxin [methocarbamol]    Review of Systems   Constitutional:  Negative for chills and fever. HENT:  Negative for congestion and rhinorrhea. Respiratory:  Negative for cough and shortness of breath. Cardiovascular:  Negative for chest pain and leg swelling. Gastrointestinal:  Positive for abdominal pain, nausea and vomiting. Negative for constipation and diarrhea. Genitourinary:  Negative for difficulty urinating, dysuria and hematuria. Musculoskeletal:  Negative for back pain and neck pain. Skin:  Negative for color change and rash. Neurological:  Positive for headaches. Negative for dizziness, weakness, light-headedness and numbness. Psychiatric/Behavioral:  Negative for agitation and confusion. Vitals:    10/18/22 2100   BP: (!) 124/90   Pulse: (!) 124   Resp: 16   Temp: 97.6 °F (36.4 °C)   SpO2: 99%            Physical Exam  Constitutional:       General: She is not in acute distress. Appearance: She is well-developed. HENT:      Head: Normocephalic and atraumatic.    Eyes: General: No scleral icterus. Pupils: Pupils are equal, round, and reactive to light. Neck:      Trachea: No tracheal deviation. Cardiovascular:      Rate and Rhythm: Normal rate and regular rhythm. Heart sounds: No murmur heard. No friction rub. No gallop. Pulmonary:      Effort: Pulmonary effort is normal. No respiratory distress. Breath sounds: Normal breath sounds. No wheezing or rales. Abdominal:      General: Bowel sounds are normal. There is no distension. Palpations: Abdomen is soft. Tenderness: There is abdominal tenderness in the epigastric area and periumbilical area. Musculoskeletal:         General: No deformity. Cervical back: Neck supple. Skin:     General: Skin is warm and dry. Neurological:      Mental Status: She is alert and oriented to person, place, and time. Psychiatric:         Behavior: Behavior normal.        MDM  Number of Diagnoses or Management Options  Chronic pancreatitis, unspecified pancreatitis type [facility])  Diagnosis management comments: 27-year-old female presenting to the ED with abdominal pain, nausea, vomiting. Differential includes chronic pancreatitis, electrolyte abnormality, CECILIA. Do not feel we need to repeat imaging today. Will check basic labs, lipase, treat with Dilaudid, Zofran, fluids, and reevaluate. Dispo pending response to treatment. Amount and/or Complexity of Data Reviewed  Clinical lab tests: ordered and reviewed           Procedures    DISCHARGE NOTE:  1:53 AM  The patient has been re-evaluated and feeling much better and are stable for discharge. All available radiology and laboratory results have been reviewed with patient and/or available family.   Patient and/or family verbally conveyed their understanding and agreement of the patient's signs, symptoms, diagnosis, treatment and prognosis and additionally agree to follow-up as recommended in the discharge instructions or to return to the Emergency Department should their condition change or worsen prior to their follow-up appointment. All questions have been answered and patient and/or available family express understanding. LABORATORY RESULTS:  Recent Results (from the past 24 hour(s))   SAMPLES BEING HELD    Collection Time: 10/18/22  9:16 PM   Result Value Ref Range    SAMPLES BEING HELD 1RED     COMMENT        Add-on orders for these samples will be processed based on acceptable specimen integrity and analyte stability, which may vary by analyte. CBC WITH AUTOMATED DIFF    Collection Time: 10/18/22  9:16 PM   Result Value Ref Range    WBC 9.6 3.6 - 11.0 K/uL    RBC 4.79 3.80 - 5.20 M/uL    HGB 13.4 11.5 - 16.0 g/dL    HCT 40.7 35.0 - 47.0 %    MCV 85.0 80.0 - 99.0 FL    MCH 28.0 26.0 - 34.0 PG    MCHC 32.9 30.0 - 36.5 g/dL    RDW 14.3 11.5 - 14.5 %    PLATELET 273 (H) 516 - 400 K/uL    MPV 8.9 8.9 - 12.9 FL    NRBC 0.0 0  WBC    ABSOLUTE NRBC 0.00 0.00 - 0.01 K/uL    NEUTROPHILS 59 32 - 75 %    LYMPHOCYTES 32 12 - 49 %    MONOCYTES 6 5 - 13 %    EOSINOPHILS 2 0 - 7 %    BASOPHILS 1 0 - 1 %    IMMATURE GRANULOCYTES 0 0.0 - 0.5 %    ABS. NEUTROPHILS 5.7 1.8 - 8.0 K/UL    ABS. LYMPHOCYTES 3.0 0.8 - 3.5 K/UL    ABS. MONOCYTES 0.6 0.0 - 1.0 K/UL    ABS. EOSINOPHILS 0.2 0.0 - 0.4 K/UL    ABS. BASOPHILS 0.1 0.0 - 0.1 K/UL    ABS. IMM. GRANS. 0.0 0.00 - 0.04 K/UL    DF AUTOMATED     METABOLIC PANEL, COMPREHENSIVE    Collection Time: 10/18/22  9:16 PM   Result Value Ref Range    Sodium 137 136 - 145 mmol/L    Potassium 3.8 3.5 - 5.1 mmol/L    Chloride 106 97 - 108 mmol/L    CO2 28 21 - 32 mmol/L    Anion gap 3 (L) 5 - 15 mmol/L    Glucose 87 65 - 100 mg/dL    BUN 3 (L) 6 - 20 MG/DL    Creatinine 0.80 0.55 - 1.02 MG/DL    BUN/Creatinine ratio 4 (L) 12 - 20      eGFR >60 >60 ml/min/1.73m2    Calcium 9.2 8.5 - 10.1 MG/DL    Bilirubin, total 0.2 0.2 - 1.0 MG/DL    ALT (SGPT) 14 12 - 78 U/L    AST (SGOT) 8 (L) 15 - 37 U/L    Alk.  phosphatase 120 (H) 45 - 117 U/L    Protein, total 8.4 (H) 6.4 - 8.2 g/dL    Albumin 4.1 3.5 - 5.0 g/dL    Globulin 4.3 (H) 2.0 - 4.0 g/dL    A-G Ratio 1.0 (L) 1.1 - 2.2     LIPASE    Collection Time: 10/18/22  9:16 PM   Result Value Ref Range    Lipase 240 73 - 393 U/L   URINALYSIS W/MICROSCOPIC    Collection Time: 10/18/22 11:49 PM   Result Value Ref Range    Color YELLOW/STRAW      Appearance CLEAR CLEAR      Specific gravity 1.009 1.003 - 1.030      pH (UA) 8.0 5.0 - 8.0      Protein Negative NEG mg/dL    Glucose Negative NEG mg/dL    Ketone Negative NEG mg/dL    Bilirubin Negative NEG      Blood MODERATE (A) NEG      Urobilinogen 0.2 0.2 - 1.0 EU/dL    Nitrites Negative NEG      Leukocyte Esterase Negative NEG      WBC 0-4 0 - 4 /hpf    RBC 20-50 0 - 5 /hpf    Epithelial cells FEW FEW /lpf    Bacteria Negative NEG /hpf    Hyaline cast 0-2 0 - 5 /lpf   URINE CULTURE HOLD SAMPLE    Collection Time: 10/18/22 11:49 PM    Specimen: Serum; Urine   Result Value Ref Range    Urine culture hold        Urine on hold in Microbiology dept for 2 days. If unpreserved urine is submitted, it cannot be used for addtional testing after 24 hours, recollection will be required. IMAGING RESULTS:  No results found. MEDICATIONS GIVEN:  Medications   HYDROmorphone (DILAUDID) injection 1 mg (has no administration in time range)   HYDROmorphone (DILAUDID) injection 1 mg (1 mg IntraVENous Given 10/19/22 0051)   sodium chloride 0.9 % bolus infusion 1,000 mL (1,000 mL IntraVENous New Bag 10/18/22 2349)   ondansetron (ZOFRAN) injection 4 mg (4 mg IntraVENous Given 10/18/22 0900)       IMPRESSION:  1.  Chronic pancreatitis, unspecified pancreatitis type [facility])        PLAN:  Follow-up Information       Follow up With Specialties Details Why Contact Info    Your GI doctor  In 1 day            Current Discharge Medication List          Signed By: Helena Toledo MD     October 19, 2022

## 2022-10-22 ENCOUNTER — HOSPITAL ENCOUNTER (EMERGENCY)
Age: 32
Discharge: HOME | End: 2022-10-22
Attending: EMERGENCY MEDICINE
Payer: COMMERCIAL

## 2022-10-22 VITALS
DIASTOLIC BLOOD PRESSURE: 92 MMHG | RESPIRATION RATE: 11 BRPM | OXYGEN SATURATION: 100 % | SYSTOLIC BLOOD PRESSURE: 115 MMHG | TEMPERATURE: 97 F | HEART RATE: 99 BPM

## 2022-10-22 DIAGNOSIS — K85.90 ACUTE ON CHRONIC PANCREATITIS (HCC): Primary | ICD-10-CM

## 2022-10-22 DIAGNOSIS — K86.1 ACUTE ON CHRONIC PANCREATITIS (HCC): Primary | ICD-10-CM

## 2022-10-22 LAB
ABO + RH BLD: NORMAL
ALBUMIN SERPL-MCNC: 3.7 G/DL (ref 3.5–5)
ALBUMIN/GLOB SERPL: 0.9 {RATIO} (ref 1.1–2.2)
ALP SERPL-CCNC: 101 U/L (ref 45–117)
ALT SERPL-CCNC: 11 U/L (ref 12–78)
ANION GAP SERPL CALC-SCNC: 5 MMOL/L (ref 5–15)
AST SERPL-CCNC: 5 U/L (ref 15–37)
BASOPHILS # BLD: 0.1 K/UL (ref 0–0.1)
BASOPHILS NFR BLD: 1 % (ref 0–1)
BILIRUB SERPL-MCNC: 0.1 MG/DL (ref 0.2–1)
BLOOD GROUP ANTIBODIES SERPL: NORMAL
BUN SERPL-MCNC: 7 MG/DL (ref 6–20)
BUN/CREAT SERPL: 10 (ref 12–20)
CALCIUM SERPL-MCNC: 8.9 MG/DL (ref 8.5–10.1)
CHLORIDE SERPL-SCNC: 104 MMOL/L (ref 97–108)
CO2 SERPL-SCNC: 26 MMOL/L (ref 21–32)
COMMENT, HOLDF: NORMAL
CREAT SERPL-MCNC: 0.72 MG/DL (ref 0.55–1.02)
DIFFERENTIAL METHOD BLD: ABNORMAL
EOSINOPHIL # BLD: 0.2 K/UL (ref 0–0.4)
EOSINOPHIL NFR BLD: 2 % (ref 0–7)
ERYTHROCYTE [DISTWIDTH] IN BLOOD BY AUTOMATED COUNT: 14.3 % (ref 11.5–14.5)
GLOBULIN SER CALC-MCNC: 4 G/DL (ref 2–4)
GLUCOSE SERPL-MCNC: 88 MG/DL (ref 65–100)
HCG SERPL QL: NEGATIVE
HCT VFR BLD AUTO: 33.8 % (ref 35–47)
HGB BLD-MCNC: 10.9 G/DL (ref 11.5–16)
IMM GRANULOCYTES # BLD AUTO: 0 K/UL (ref 0–0.04)
IMM GRANULOCYTES NFR BLD AUTO: 0 % (ref 0–0.5)
INR PPP: 0.9 (ref 0.9–1.1)
LIPASE SERPL-CCNC: 263 U/L (ref 73–393)
LYMPHOCYTES # BLD: 4 K/UL (ref 0.8–3.5)
LYMPHOCYTES NFR BLD: 39 % (ref 12–49)
MCH RBC QN AUTO: 27.3 PG (ref 26–34)
MCHC RBC AUTO-ENTMCNC: 32.2 G/DL (ref 30–36.5)
MCV RBC AUTO: 84.7 FL (ref 80–99)
MONOCYTES # BLD: 0.6 K/UL (ref 0–1)
MONOCYTES NFR BLD: 6 % (ref 5–13)
NEUTS SEG # BLD: 5.3 K/UL (ref 1.8–8)
NEUTS SEG NFR BLD: 52 % (ref 32–75)
NRBC # BLD: 0 K/UL (ref 0–0.01)
NRBC BLD-RTO: 0 PER 100 WBC
PLATELET # BLD AUTO: 500 K/UL (ref 150–400)
PMV BLD AUTO: 8.9 FL (ref 8.9–12.9)
POTASSIUM SERPL-SCNC: 4 MMOL/L (ref 3.5–5.1)
PROT SERPL-MCNC: 7.7 G/DL (ref 6.4–8.2)
PROTHROMBIN TIME: 9.9 SEC (ref 9–11.1)
RBC # BLD AUTO: 3.99 M/UL (ref 3.8–5.2)
SAMPLES BEING HELD,HOLD: NORMAL
SODIUM SERPL-SCNC: 135 MMOL/L (ref 136–145)
SPECIMEN EXP DATE BLD: NORMAL
WBC # BLD AUTO: 10.2 K/UL (ref 3.6–11)

## 2022-10-22 PROCEDURE — 99284 EMERGENCY DEPT VISIT MOD MDM: CPT

## 2022-10-22 PROCEDURE — 86900 BLOOD TYPING SEROLOGIC ABO: CPT

## 2022-10-22 PROCEDURE — 96374 THER/PROPH/DIAG INJ IV PUSH: CPT

## 2022-10-22 PROCEDURE — 85610 PROTHROMBIN TIME: CPT

## 2022-10-22 PROCEDURE — 36415 COLL VENOUS BLD VENIPUNCTURE: CPT

## 2022-10-22 PROCEDURE — 83690 ASSAY OF LIPASE: CPT

## 2022-10-22 PROCEDURE — 84703 CHORIONIC GONADOTROPIN ASSAY: CPT

## 2022-10-22 PROCEDURE — 80053 COMPREHEN METABOLIC PANEL: CPT

## 2022-10-22 PROCEDURE — 74011250636 HC RX REV CODE- 250/636: Performed by: EMERGENCY MEDICINE

## 2022-10-22 PROCEDURE — 85025 COMPLETE CBC W/AUTO DIFF WBC: CPT

## 2022-10-22 PROCEDURE — 74011250637 HC RX REV CODE- 250/637: Performed by: EMERGENCY MEDICINE

## 2022-10-22 RX ORDER — ONDANSETRON 2 MG/ML
4 INJECTION INTRAMUSCULAR; INTRAVENOUS
Status: COMPLETED | OUTPATIENT
Start: 2022-10-22 | End: 2022-10-22

## 2022-10-22 RX ORDER — HYDROMORPHONE HYDROCHLORIDE 2 MG/1
2 TABLET ORAL
Status: DISCONTINUED | OUTPATIENT
Start: 2022-10-22 | End: 2022-10-23 | Stop reason: HOSPADM

## 2022-10-22 RX ADMIN — SODIUM CHLORIDE 1000 ML: 9 INJECTION, SOLUTION INTRAVENOUS at 21:22

## 2022-10-22 RX ADMIN — HYDROMORPHONE HYDROCHLORIDE 2 MG: 2 TABLET ORAL at 21:22

## 2022-10-22 RX ADMIN — ONDANSETRON 4 MG: 2 INJECTION INTRAMUSCULAR; INTRAVENOUS at 21:19

## 2022-10-22 NOTE — ED TRIAGE NOTES
Pt presents to department with a CC of heavy vaginal bleeding since Tuesday. She is on blood thinners for recent heart surgery and reports there are golf-ball sized clots. Reports 2 pads/hour. Pt feels lightheaded and dizzy. Also reports abdominal pain. PMHx of pancreatitis.

## 2022-10-23 NOTE — ED PROVIDER NOTES
19-year-old female with PMHx asthma, Raynaud's, CVA, chronic pancreatitis, PFO status postrepair last week now on blood thinners for 3 months presents emergency department complaining of gradual onset, 8/10 epigastric abdominal pain that radiates to her back times earlier today. Patient also reports heavy vaginal bleeding x4 days with passage of clots. She reports some associated presyncopal dizziness but denies dyspnea, chest pain. Patient states that she is on a pain management plan including 2 mg of Dilaudid 3 times per day, and states that she missed her evening dose. She has no additional complaints at this time. The history is provided by the patient. Vaginal Bleeding  This is a new problem. The current episode started more than 2 days ago. The problem occurs constantly. Associated symptoms include abdominal pain. Pertinent negatives include no chest pain and no shortness of breath. The strength.   Reviewed the insurance company    Past Medical History:   Diagnosis Date    Adverse effect of anesthesia     Asthma     Heart murmur     PFO (patent foramen ovale)     Raynaud disease     Stroke [facility])     UTI (lower urinary tract infection)        Past Surgical History:   Procedure Laterality Date    HX ORTHOPAEDIC      realignment on left knee    HX WISDOM TEETH EXTRACTION      [place] CARDI[facility] SUR[facility] PROC[place]URE [place]LIST      [place] REVISION OF UNSTABLE PATELLA      RIGHT         Family History:   Problem Relation Age of Onset    No Known Problems Mother     No Known Problems Father     Cancer Neg Hx         no known history but limited knowledge       Social History     Socioeconomic History    Marital status:      Spouse name: Not on file    Number of children: Not on file    Years of education: Not on file    Highest education level: Not on file   Occupational History    Not on file   Tobacco Use    Smoking status: Former     Packs/day: 0.25     Types: Cigarettes     Quit date: 2022     Years since quitting: 0.8    Smokeless tobacco: Never   Substance and Sexual Activity    Alcohol use: Not Currently    Drug use: No    Sexual activity: Yes   Other Topics Concern    Not on file   Social History Narrative    No known chemical exposures. Social Determinants of Health     Financial Resource Strain: Not on file   Food Insecurity: Not on file   Transportation Needs: Not on file   Physical Activity: Not on file   Stress: Not on file   Social Connections: Not on file   Intimate Partner Violence: Not on file   Housing Stability: Not on file         ALLERGIES: Imitrex [sumatriptan succinate], Imitrex [sumatriptan], and Robaxin [methocarbamol]    Review of Systems   Constitutional: Negative. HENT: Negative. Eyes: Negative. Respiratory: Negative. Negative for shortness of breath. Cardiovascular: Negative. Negative for chest pain. Gastrointestinal:  Positive for abdominal pain. Endocrine: Negative. Genitourinary:  Positive for vaginal bleeding. Musculoskeletal: Negative. Skin: Negative. Neurological:  Positive for dizziness. Hematological: Negative. Psychiatric/Behavioral: Negative. Vitals:    10/22/22 1716   BP: 119/83   Pulse: (!) 141   Resp: 18   Temp: 97 °F (36.1 °C)   SpO2: 100%            Physical Exam  Vitals and nursing note reviewed. Constitutional:       General: She is not in acute distress. Appearance: Normal appearance. She is not ill-appearing. HENT:      Head: Normocephalic and atraumatic. Nose: Nose normal.      Mouth/Throat:      Mouth: Mucous membranes are moist.   Eyes:      Extraocular Movements: Extraocular movements intact. Pupils: Pupils are equal, round, and reactive to light. Cardiovascular:      Rate and Rhythm: Regular rhythm. Tachycardia present. Pulses: Normal pulses. Pulmonary:      Effort: Pulmonary effort is normal. No respiratory distress. Breath sounds: Normal breath sounds.    Abdominal:      General: There is no distension. Palpations: Abdomen is soft. Tenderness: There is abdominal tenderness. Musculoskeletal:         General: Normal range of motion. Cervical back: Normal range of motion and neck supple. Skin:     General: Skin is warm and dry. Neurological:      General: No focal deficit present. Mental Status: She is alert and oriented to person, place, and time. Psychiatric:         Mood and Affect: Mood normal.        MDM  Number of Diagnoses or Management Options  Acute on chronic pancreatitis [facility]): established and worsening  Diagnosis management comments: DDx: pancreatitis, cholecystitis, diverticulitis, colitis, gastritis    Plan:  - Labs: CBC, CMP, lipase, UA  - Imaging: None indicated   - Medications: Hydromorphone, ondansetron    Reassessment: Patient reports minimal improvement with the above intervention, however she is under pain management plan. The remainder of her work-up is reassuring, including a hemoglobin of 10. She was advised to follow-up with her PCP, pain management specialist, and OB/GYN, with whom she has an appointment in the next few weeks.   Patient may safely be discharged at this time with recommendation to follow her prescribed pain regimen and to return to the emergency department for worsening of symptoms, or development of dyspnea, chest pain, syncope       Amount and/or Complexity of Data Reviewed  Clinical lab tests: reviewed  Decide to obtain previous medical records or to obtain history from someone other than the patient: yes    Risk of Complications, Morbidity, and/or Mortality  Presenting problems: low  Diagnostic procedures: low  Management options: low    Patient Progress  Patient progress: improved         Procedures

## 2022-10-23 NOTE — ED NOTES
Pt given ordered dilaudid po 2mg per md order. Pt stated \"I want this iv\", explained it was ordered this way. Pt began crying and stated, \"I doesn't work this way\". Pt grabbed dilaudid out of my hand, took dilaudid and threw medicine cup.

## 2022-10-23 NOTE — DISCHARGE INSTRUCTIONS
You were seen in the emergency department for epigastric pain and vaginal bleeding. The results of your tests including an EKG and lab work, were normal.  Although an exact cause of your symptoms was not identified, the most likely cause is acute on chronic pancreatitis and medication side effect from your blood thinner. Please take any medications prescribed at this visit as instructed. Please also follow-up with your PCP or return to the emergency department if you experience a worsening of symptoms or any new symptoms that are concerning to you.

## 2022-11-04 ENCOUNTER — HOSPITAL ENCOUNTER (OUTPATIENT)
Age: 32
Setting detail: OUTPATIENT SURGERY
Discharge: HOME | End: 2022-11-04
Attending: INTERNAL MEDICINE | Admitting: INTERNAL MEDICINE
Payer: COMMERCIAL

## 2022-11-04 ENCOUNTER — TRANSCRIBE ORDER (OUTPATIENT)
Dept: SCHEDULING | Age: 32
End: 2022-11-04

## 2022-11-04 ENCOUNTER — ANESTHESIA EVENT (OUTPATIENT)
Dept: ENDOSCOPY | Age: 32
End: 2022-11-04
Payer: COMMERCIAL

## 2022-11-04 ENCOUNTER — APPOINTMENT (OUTPATIENT)
Dept: ULTRASOUND IMAGING | Age: 32
End: 2022-11-04
Attending: INTERNAL MEDICINE
Payer: COMMERCIAL

## 2022-11-04 ENCOUNTER — ANESTHESIA (OUTPATIENT)
Dept: ENDOSCOPY | Age: 32
End: 2022-11-04
Payer: COMMERCIAL

## 2022-11-04 VITALS
DIASTOLIC BLOOD PRESSURE: 83 MMHG | OXYGEN SATURATION: 100 % | WEIGHT: 117 LBS | SYSTOLIC BLOOD PRESSURE: 114 MMHG | TEMPERATURE: 98.6 F | RESPIRATION RATE: 9 BRPM | BODY MASS INDEX: 18.19 KG/M2 | HEART RATE: 101 BPM

## 2022-11-04 DIAGNOSIS — K85.90 ACUTE PANCREATITIS, UNSPECIFIED COMPLICATION STATUS, UNSPECIFIED PANCREATITIS TYPE: Primary | ICD-10-CM

## 2022-11-04 DIAGNOSIS — K86.2 CYST OF PANCREAS: ICD-10-CM

## 2022-11-04 LAB — HCG UR QL: NEGATIVE

## 2022-11-04 PROCEDURE — 81025 URINE PREGNANCY TEST: CPT

## 2022-11-04 PROCEDURE — 76060000032 HC ANESTHESIA 0.5 TO 1 HR: Performed by: INTERNAL MEDICINE

## 2022-11-04 PROCEDURE — C2625 STENT, NON-COR, TEM W/DEL SY: HCPCS | Performed by: INTERNAL MEDICINE

## 2022-11-04 PROCEDURE — 74011250636 HC RX REV CODE- 250/636: Performed by: ANESTHESIOLOGY

## 2022-11-04 PROCEDURE — 74011000250 HC RX REV CODE- 250: Performed by: ANESTHESIOLOGY

## 2022-11-04 PROCEDURE — 2709999900 HC NON-CHARGEABLE SUPPLY: Performed by: INTERNAL MEDICINE

## 2022-11-04 PROCEDURE — 77030034509 HC NDL ENDOSC BNX ASPIR SYS COVD -D: Performed by: INTERNAL MEDICINE

## 2022-11-04 PROCEDURE — 88112 CYTOPATH CELL ENHANCE TECH: CPT

## 2022-11-04 PROCEDURE — 82150 ASSAY OF AMYLASE: CPT

## 2022-11-04 PROCEDURE — 76040000007: Performed by: INTERNAL MEDICINE

## 2022-11-04 DEVICE — STENT AND ELECTROCAUTERY - ENHANCED DELIVERY SYSTEM
Type: IMPLANTABLE DEVICE | Site: STOMACH | Status: FUNCTIONAL
Brand: AXIOS™

## 2022-11-04 RX ORDER — SODIUM CHLORIDE 0.9 % (FLUSH) 0.9 %
5-40 SYRINGE (ML) INJECTION AS NEEDED
Status: DISCONTINUED | OUTPATIENT
Start: 2022-11-04 | End: 2022-11-04 | Stop reason: HOSPADM

## 2022-11-04 RX ORDER — CLOPIDOGREL BISULFATE 75 MG/1
TABLET ORAL
COMMUNITY

## 2022-11-04 RX ORDER — SODIUM CHLORIDE 9 MG/ML
INJECTION, SOLUTION INTRAVENOUS
Status: DISCONTINUED | OUTPATIENT
Start: 2022-11-04 | End: 2022-11-04 | Stop reason: HOSPADM

## 2022-11-04 RX ORDER — ATROPINE SULFATE 0.1 MG/ML
0.5 INJECTION INTRAVENOUS
Status: DISCONTINUED | OUTPATIENT
Start: 2022-11-04 | End: 2022-11-04 | Stop reason: HOSPADM

## 2022-11-04 RX ORDER — DEXTROMETHORPHAN/PSEUDOEPHED 2.5-7.5/.8
1.2 DROPS ORAL
Status: DISCONTINUED | OUTPATIENT
Start: 2022-11-04 | End: 2022-11-04 | Stop reason: HOSPADM

## 2022-11-04 RX ORDER — CEFAZOLIN SODIUM 1 G/3ML
INJECTION, POWDER, FOR SOLUTION INTRAMUSCULAR; INTRAVENOUS AS NEEDED
Status: DISCONTINUED | OUTPATIENT
Start: 2022-11-04 | End: 2022-11-04 | Stop reason: HOSPADM

## 2022-11-04 RX ORDER — CEFAZOLIN SODIUM 1 G/3ML
INJECTION, POWDER, FOR SOLUTION INTRAMUSCULAR; INTRAVENOUS
Status: COMPLETED
Start: 2022-11-04 | End: 2022-11-04

## 2022-11-04 RX ORDER — GABAPENTIN 300 MG/1
300 CAPSULE ORAL 2 TIMES DAILY
COMMUNITY

## 2022-11-04 RX ORDER — LIDOCAINE HYDROCHLORIDE 20 MG/ML
INJECTION, SOLUTION EPIDURAL; INFILTRATION; INTRACAUDAL; PERINEURAL AS NEEDED
Status: DISCONTINUED | OUTPATIENT
Start: 2022-11-04 | End: 2022-11-04 | Stop reason: HOSPADM

## 2022-11-04 RX ORDER — FLUMAZENIL 0.1 MG/ML
0.2 INJECTION INTRAVENOUS
Status: DISCONTINUED | OUTPATIENT
Start: 2022-11-04 | End: 2022-11-04 | Stop reason: HOSPADM

## 2022-11-04 RX ORDER — SODIUM CHLORIDE 9 MG/ML
50 INJECTION, SOLUTION INTRAVENOUS CONTINUOUS
Status: DISCONTINUED | OUTPATIENT
Start: 2022-11-04 | End: 2022-11-04 | Stop reason: HOSPADM

## 2022-11-04 RX ORDER — HYDROMORPHONE HYDROCHLORIDE 2 MG/1
TABLET ORAL
COMMUNITY

## 2022-11-04 RX ORDER — EPINEPHRINE 0.1 MG/ML
1 INJECTION INTRACARDIAC; INTRAVENOUS
Status: DISCONTINUED | OUTPATIENT
Start: 2022-11-04 | End: 2022-11-04 | Stop reason: HOSPADM

## 2022-11-04 RX ORDER — PROPOFOL 10 MG/ML
INJECTION, EMULSION INTRAVENOUS AS NEEDED
Status: DISCONTINUED | OUTPATIENT
Start: 2022-11-04 | End: 2022-11-04 | Stop reason: HOSPADM

## 2022-11-04 RX ORDER — NALOXONE HYDROCHLORIDE 0.4 MG/ML
0.4 INJECTION, SOLUTION INTRAMUSCULAR; INTRAVENOUS; SUBCUTANEOUS
Status: DISCONTINUED | OUTPATIENT
Start: 2022-11-04 | End: 2022-11-04 | Stop reason: HOSPADM

## 2022-11-04 RX ADMIN — PROPOFOL 25 MG: 10 INJECTION, EMULSION INTRAVENOUS at 12:13

## 2022-11-04 RX ADMIN — PROPOFOL 50 MG: 10 INJECTION, EMULSION INTRAVENOUS at 12:00

## 2022-11-04 RX ADMIN — SODIUM CHLORIDE: 900 INJECTION, SOLUTION INTRAVENOUS at 11:34

## 2022-11-04 RX ADMIN — PROPOFOL 25 MG: 10 INJECTION, EMULSION INTRAVENOUS at 12:06

## 2022-11-04 RX ADMIN — PROPOFOL 50 MG: 10 INJECTION, EMULSION INTRAVENOUS at 12:20

## 2022-11-04 RX ADMIN — PROPOFOL 150 MG: 10 INJECTION, EMULSION INTRAVENOUS at 11:57

## 2022-11-04 RX ADMIN — PROPOFOL 25 MG: 10 INJECTION, EMULSION INTRAVENOUS at 12:16

## 2022-11-04 RX ADMIN — LIDOCAINE HYDROCHLORIDE 50 MG: 20 INJECTION, SOLUTION EPIDURAL; INFILTRATION; INTRACAUDAL; PERINEURAL at 11:57

## 2022-11-04 RX ADMIN — PROPOFOL 50 MG: 10 INJECTION, EMULSION INTRAVENOUS at 11:58

## 2022-11-04 RX ADMIN — PROPOFOL 25 MG: 10 INJECTION, EMULSION INTRAVENOUS at 12:18

## 2022-11-04 RX ADMIN — PROPOFOL 25 MG: 10 INJECTION, EMULSION INTRAVENOUS at 12:08

## 2022-11-04 RX ADMIN — PROPOFOL 50 MG: 10 INJECTION, EMULSION INTRAVENOUS at 12:03

## 2022-11-04 RX ADMIN — CEFAZOLIN 2 G: 330 INJECTION, POWDER, FOR SOLUTION INTRAMUSCULAR; INTRAVENOUS at 11:58

## 2022-11-04 RX ADMIN — PROPOFOL 25 MG: 10 INJECTION, EMULSION INTRAVENOUS at 12:10

## 2022-11-04 RX ADMIN — PROPOFOL 50 MG: 10 INJECTION, EMULSION INTRAVENOUS at 12:01

## 2022-11-04 NOTE — ANESTHESIA PREPROCEDURE EVALUATION
Relevant Problems   No relevant active problems       Anesthetic History   No history of anesthetic complications            Review of Systems / Medical History  Patient summary reviewed, nursing notes reviewed and pertinent labs reviewed    Pulmonary  Within defined limits          Asthma        Neuro/Psych   Within defined limits    CVA       Cardiovascular  Within defined limits                Exercise tolerance: >4 METS  Comments: pfo   GI/Hepatic/Renal  Within defined limits              Endo/Other  Within defined limits           Other Findings              Physical Exam    Airway  Mallampati: II  TM Distance: > 6 cm  Neck ROM: normal range of motion   Mouth opening: Normal     Cardiovascular  Regular rate and rhythm,  S1 and S2 normal,  no murmur, click, rub, or gallop             Dental  No notable dental hx       Pulmonary  Breath sounds clear to auscultation               Abdominal  GI exam deferred       Other Findings            Anesthetic Plan    ASA: 2  Anesthesia type: MAC          Induction: Intravenous  Anesthetic plan and risks discussed with: Patient

## 2022-11-04 NOTE — ANESTHESIA POSTPROCEDURE EVALUATION
Procedure(s):  ENDOSCOPIC ULTRASOUND (EUS)  ESOPHAGOGASTRODUODENOSCOPY (EGD)  FINE NEEDLE ASPIRATION  ENDOSCOPY WITH PROSTHESIS OR STENT PLACEMENT. MAC    Anesthesia Post Evaluation        Patient location during evaluation: PACU  Note status: Adequate. Level of consciousness: responsive to verbal stimuli and sleepy but conscious  Pain management: satisfactory to patient  Airway patency: patent  Anesthetic complications: no  Cardiovascular status: acceptable  Respiratory status: acceptable  Hydration status: acceptable  Comments: +Post-Anesthesia Evaluation and Assessment    Patient: Kandi Escobar MRN: [id number]  SSN: [id number]   YOB: 1990  Age: 32 y.o. Sex: female          Cardiovascular Function/Vital Signs    /83   Pulse (!) 101   Temp 37 °C (98.6 °F)   Resp 9   Wt 53.1 kg (117 lb)   SpO2 100%   Breastfeeding No   BMI 18.19 kg/m²     Patient is status post Procedure(s):  ENDOSCOPIC ULTRASOUND (EUS)  ESOPHAGOGASTRODUODENOSCOPY (EGD)  FINE NEEDLE ASPIRATION  ENDOSCOPY WITH PROSTHESIS OR STENT PLACEMENT. Nausea/Vomiting: Controlled. Postoperative hydration reviewed and adequate. Pain:  Pain Scale 1: Numeric (0 - 10) (11/04/22 1245)  Pain Intensity 1: 0 (11/04/22 1245)   Managed. Neurological Status: At baseline. Mental Status and Level of Consciousness: Arousable. Pulmonary Status:   O2 Device: None (11/04/22 1245)   Adequate oxygenation and airway patent. Complications related to anesthesia: None    Post-anesthesia assessment completed. No concerns. I have evaluated the patient and the patient is stable and ready to be discharged from PACU . Signed By: Giovanna Fregoso MD    11/4/2022        INITIAL Post-op Vital signs:   Vitals Value Taken Time   /83 11/04/22 1245   Temp     Pulse 92 11/04/22 1257   Resp 14 11/04/22 1257   SpO2 98 % 11/04/22 1257   Vitals shown include unvalidated device data.

## 2022-11-04 NOTE — PROCEDURES
[address] [facility].  [address] [facility] [address] [facility], [address]  [phone]                                Endoscopic Ultrasound    NAME:  Lawson Monique   :   1990   MRN:   [id number]       Date/Time:  2022   Procedure Type: Linear Upper EUS with pseudocyst-gastrostomy      Indications: Pancreatic pseudocyst    Pre-operative Diagnosis: see indication above    Post-operative Diagnosis:  See findings below    : Waldo Segura MD    Surgical Assistant: Endoscopy Technician-1: Kwame Manzo  Endoscopy RN-1: Edwian Manuel RN    Implants: 1 Axios stent 15 mm x 10 mm was placed in the pseudocyst.  Stent was in good position. Referring Provider: -Darren Myrick MD -Lisa Escalante DO    Anethesia/Sedation:  MAC anesthesia      Procedure Details   After infom consent was obtained for the procedure, with all risks and benefits of procedure explained the patient was taken to the endoscopy suite and placed in the left lateral decubitus position. Following sequential administration of sedation as per above, the linear echoendoscope was inserted into the mouth and advanced under direct vision to second portion of the duodenum. A careful inspection was made as the gastroscope was withdrawn, including a retroflexed view of the proximal stomach; findings and interventions are described below. Findings:     Endoscopic:   Esophagus:normal   Stomach: normal    Duodenum/jejunum: normal    Ultrasound:   Esophagus: not examined   Stomach: not examined   Pancreas:     Areas examined: Obscured by a large pseudocyst    Parenchyma: -A large pseudocyst measuring 57 mm x 58 mm was noted in the pancreatic neck and body. This was obscuring the larger part of the pancreas. No clear communication with the pancreatic duct could be noticed. The cyst wall was well formed and small amount of layering debris was noted. Cyst was within 1 cm of the gastric wall.     Pancreatic Duct: not examined   Liver:     Parenchyma: not examined    Gallbladder: not examined    Bile Duct: Not examined               Lymph Node: not examined        Specimen Removed:   Pancreatic cyst fluid    Complications: None. EBL:  None. Interventions: Fine needle aspirate 15cc clear was performed of the pancreas  using a 22 gauge needle with 1 of passes with preliminary results suggesting indetermined. Cyst gastrostomy was performed using hot Axios stent 15 mm x 10 mm. Stent positioning was assisted by Doppler. Stent was in good position and copious amount of clear fluid was draining.       Recommendations:   -Await fluid cytology and amylase levels  -Follow symptoms  -FOllow up in office in 3-4 weeks  -Ct scan in 3-4 weeks prior to office visit  -COntinue current medications    Tramaine Dawn MD  11/4/2022  12:24 PM

## 2022-11-04 NOTE — H&P
[address] [facility] [address]  [address] [place], [address]  [phone]                                History and Physical     NAME: Alfredo Infante   :  1990   MRN:  [id number]     HPI:  The patient was seen and examined. Past Surgical History:   Procedure Laterality Date    HX ORTHOPAEDIC      realignment on left knee    HX WISDOM TEETH EXTRACTION      SD CARDIAC SURG PROCEDURE UNLIST      PFO repair    SD REVISION OF UNSTABLE PATELLA      RIGHT     Past Medical History:   Diagnosis Date    Adverse effect of anesthesia     Asthma     Heart murmur     PFO (patent foramen ovale)     Raynaud disease     Stroke (HCC)     UTI (lower urinary tract infection)      Social History     Tobacco Use    Smoking status: Former     Packs/day: 0.25     Types: Cigarettes     Quit date:      Years since quittin.8    Smokeless tobacco: Never   Substance Use Topics    Alcohol use: Not Currently    Drug use: No     Allergies   Allergen Reactions    Imitrex [Sumatriptan Succinate] Palpitations    Imitrex [Sumatriptan] Palpitations    Robaxin [Methocarbamol] Vertigo     Family History   Problem Relation Age of Onset    No Known Problems Mother     No Known Problems Father     Cancer Neg Hx         no known history but limited knowledge     Current Facility-Administered Medications   Medication Dose Route Frequency    0.9% sodium chloride infusion  50 mL/hr IntraVENous CONTINUOUS    sodium chloride (NS) flush 5-40 mL  5-40 mL IntraVENous PRN    naloxone (NARCAN) injection 0.4 mg  0.4 mg IntraVENous Multiple    flumazeniL (ROMAZICON) 0.1 mg/mL injection 0.2 mg  0.2 mg IntraVENous Multiple    simethicone (MYLICON) 72QB/8.0YS oral drops 80 mg  1.2 mL Oral Multiple    atropine injection 0.5 mg  0.5 mg IntraVENous ONCE PRN    EPINEPHrine (ADRENALIN) 0.1 mg/mL syringe 1 mg  1 mg Endoscopically ONCE PRN         PHYSICAL EXAM:  General: WD, WN.  Alert, cooperative, no acute distress    HEENT: NC, Atraumatic. PERRLA, EOMI. Anicteric sclerae. Lungs:  CTA Bilaterally. No Wheezing/Rhonchi/Rales. Heart:  Regular  rhythm,  No murmur, No Rubs, No Gallops  Abdomen: Soft, Non distended, Non tender. +Bowel sounds, no HSM  Extremities: No c/c/e  Neurologic:  CN 2-12 gi, Alert and oriented X 3. No acute neurological distress   Psych:   Good insight. Not anxious nor agitated. The heart, lungs and mental status were satisfactory for the administration of MAC sedation and for the procedure. Mallampati score: 2     The patient was counseled at length about the risks of michael Covid-19 in the raji-operative and post-operative states including the recovery window of their procedure. The patient was made aware that michael Covid-19 after a surgical procedure may worsen their prognosis for recovering from the virus and lend to a higher morbidity and or mortality risk. The patient was given the options of postponing their procedure. All of the risks, benefits, and alternatives were discussed. The patient does  wish to proceed with the procedure.       Assessment:   Pancreas pseudocyst    Plan:   Endoscopic procedure  MAC sedation

## 2022-11-04 NOTE — PROGRESS NOTES

## 2022-11-04 NOTE — DISCHARGE INSTRUCTIONS
[address] [facility].  [address]  [phone]  1990    DISCOMFORT:  Sore throat- throat lozenges or warm salt water gargle  redness at IV site- apply warm compress to area; if redness or soreness persist- contact your physician  Gaseous discomfort- walking, belching will help relieve any discomfort    DIET  You may eat and drink after you leave. You may resume your regular diet - however -  remember your colon is empty and a heavy meal will produce gas. Avoid these foods:  vegetables, fried / greasy foods, carbonated drinks   You may not drink alcoholic beverages for at least 12 hours    ACTIVITY  You may resume your normal daily activities   Spend the remainder of the day resting -  avoid any strenuous activity. You may not operate a vehicle for 12 hours  You may not engage in an occupation involving machinery or appliances for rest of today  Avoid making any critical decisions for at least 24 hour    CALL M.D. ANY SIGN OF   Increasing pain, nausea, vomiting  Abdominal distension (swelling)  New increased bleeding (oral or rectal)  Fever (chills)  Pain in chest area  Bloody discharge from nose or mouth  Shortness of breath    Follow-up Instructions:   Call Dr. Olman Minaya for any questions or problems. If we took a biopsy please call the office within 2 weeks to discuss your pathology results. Telephone # [phone]       ENDOSCOPY FINDINGS:   Pancreatic Pseudocyst       Post-procedure recommendations:   -Await fluid cytology and amylase levels  -Follow symptoms  -FOllow up in office in 3-4 weeks  -Ct scan in 3-4 weeks prior to office visit  -COntinue current medications    Learning About Coronavirus (COVID-19)  Coronavirus (COVID-19): Overview  What is coronavirus (COVID-19)? The coronavirus disease (COVID-19) is caused by a virus.  It is an illness that was first found in [place], [place], in December 2019. It has since spread worldwide. The virus can cause fever, cough, and trouble breathing. In severe cases, it can cause pneumonia and make it hard to breathe without help. It can cause death. Coronaviruses are a large group of viruses. They cause the common cold. They also cause more serious illnesses like Middle East respiratory syndrome (MERS) and severe acute respiratory syndrome (SARS). COVID-19 is caused by a novel coronavirus. That means it's a new type that has not been seen in people before. This virus spreads person-to-person through droplets from coughing and sneezing. It can also spread when you are close to someone who is infected. And it can spread when you touch something that has the virus on it, such as a doorknob or a tabletop. What can you do to protect yourself from coronavirus (COVID-19)? The best way to protect yourself from getting sick is to: Avoid areas where there is an outbreak. Avoid contact with people who may be infected. Wash your hands often with soap or alcohol-based hand sanitizers. Avoid crowds and try to stay at least 6 feet away from other people. Wash your hands often, especially after you cough or sneeze. Use soap and water, and scrub for at least 20 seconds. If soap and water aren't available, use an alcohol-based hand . Avoid touching your mouth, nose, and eyes. What can you do to avoid spreading the virus to others? To help avoid spreading the virus to others:  Cover your mouth with a tissue when you cough or sneeze. Then throw the tissue in the trash. Use a disinfectant to clean things that you touch often. Stay home if you are sick or have been exposed to the virus. Don't go to school, work, or public areas. And don't use public transportation. If you are sick:  Leave your home only if you need to get medical care. But call the doctor's office first so they know you're coming. And wear a face mask, if you have one.   If you have a face mask, wear it whenever you're around other people. It can help stop the spread of the virus when you cough or sneeze. Clean and disinfect your home every day. Use household  and disinfectant wipes or sprays. Take special care to clean things that you grab with your hands. These include doorknobs, remote controls, phones, and handles on your refrigerator and microwave. And don't forget countertops, tabletops, bathrooms, and computer keyboards. When to call for help  Call 911 anytime you think you may need emergency care. For example, call if:  You have severe trouble breathing. (You can't talk at all.)  You have constant chest pain or pressure. You are severely dizzy or lightheaded. You are confused or can't think clearly. Your face and lips have a blue color. You pass out (lose consciousness) or are very hard to wake up. Call your doctor now if you develop symptoms such as:  Shortness of breath. Fever. Cough. If you need to get care, call ahead to the doctor's office for instructions before you go. Make sure you wear a face mask, if you have one, to prevent exposing other people to the virus. Where can you get the latest information? The following health organizations are tracking and studying this virus. Their websites contain the most up-to-date information. Luis Rickey also learn what to do if you think you may have been exposed to the virus. U.S. Centers for Disease Control and Prevention (CDC): The CDC provides updated news about the disease and travel advice. The website also tells you how to prevent the spread of infection. www.cdc.gov  World Health Organization [facility]): WHO offers information about the virus outbreaks. WHO also has travel advice. www.who.int  Current as of: April 1, 2020               Content Version: 12.4  © 2006-2020 [employer].    Care instructions adapted under license by your healthcare professional. If you have questions about a medical condition or this instruction, always ask your healthcare professional. [place] [address] any warranty or liability for your use of this information.

## 2022-11-06 LAB
AMYLASE FLD-CCNC: NORMAL U/L
SPECIMEN SOURCE FLD: NORMAL

## 2022-11-07 ENCOUNTER — APPOINTMENT (OUTPATIENT)
Dept: CT IMAGING | Age: 32
End: 2022-11-07
Attending: STUDENT IN AN ORGANIZED HEALTH CARE EDUCATION/TRAINING PROGRAM
Payer: COMMERCIAL

## 2022-11-07 ENCOUNTER — HOSPITAL ENCOUNTER (EMERGENCY)
Age: 32
Discharge: HOME | End: 2022-11-07
Attending: STUDENT IN AN ORGANIZED HEALTH CARE EDUCATION/TRAINING PROGRAM
Payer: COMMERCIAL

## 2022-11-07 VITALS
HEART RATE: 109 BPM | RESPIRATION RATE: 20 BRPM | TEMPERATURE: 97.9 F | DIASTOLIC BLOOD PRESSURE: 75 MMHG | SYSTOLIC BLOOD PRESSURE: 110 MMHG | OXYGEN SATURATION: 99 %

## 2022-11-07 DIAGNOSIS — D64.9 ANEMIA, UNSPECIFIED TYPE: ICD-10-CM

## 2022-11-07 DIAGNOSIS — R10.84 ABDOMINAL PAIN, GENERALIZED: Primary | ICD-10-CM

## 2022-11-07 LAB
ALBUMIN SERPL-MCNC: 3.4 G/DL (ref 3.5–5)
ALBUMIN/GLOB SERPL: 0.7 {RATIO} (ref 1.1–2.2)
ALP SERPL-CCNC: 90 U/L (ref 45–117)
ALT SERPL-CCNC: 13 U/L (ref 12–78)
ANION GAP SERPL CALC-SCNC: 2 MMOL/L (ref 5–15)
APPEARANCE UR: CLEAR
AST SERPL-CCNC: 4 U/L (ref 15–37)
BACTERIA URNS QL MICRO: NEGATIVE /HPF
BASOPHILS # BLD: 0.1 K/UL (ref 0–0.1)
BASOPHILS NFR BLD: 1 % (ref 0–1)
BILIRUB SERPL-MCNC: 0.2 MG/DL (ref 0.2–1)
BILIRUB UR QL: NEGATIVE
BUN SERPL-MCNC: 9 MG/DL (ref 6–20)
BUN/CREAT SERPL: 15 (ref 12–20)
CALCIUM SERPL-MCNC: 8.8 MG/DL (ref 8.5–10.1)
CHLORIDE SERPL-SCNC: 108 MMOL/L (ref 97–108)
CO2 SERPL-SCNC: 25 MMOL/L (ref 21–32)
COLOR UR: ABNORMAL
COMMENT, HOLDF: NORMAL
CREAT SERPL-MCNC: 0.62 MG/DL (ref 0.55–1.02)
DIFFERENTIAL METHOD BLD: ABNORMAL
EOSINOPHIL # BLD: 0.1 K/UL (ref 0–0.4)
EOSINOPHIL NFR BLD: 1 % (ref 0–7)
EPITH CASTS URNS QL MICRO: ABNORMAL /LPF
ERYTHROCYTE [DISTWIDTH] IN BLOOD BY AUTOMATED COUNT: 15 % (ref 11.5–14.5)
GLOBULIN SER CALC-MCNC: 4.8 G/DL (ref 2–4)
GLUCOSE SERPL-MCNC: 93 MG/DL (ref 65–100)
GLUCOSE UR STRIP.AUTO-MCNC: NEGATIVE MG/DL
HCG UR QL: NEGATIVE
HCT VFR BLD AUTO: 26.8 % (ref 35–47)
HGB BLD-MCNC: 8.5 G/DL (ref 11.5–16)
HGB UR QL STRIP: NEGATIVE
HYALINE CASTS URNS QL MICRO: ABNORMAL /LPF (ref 0–5)
IMM GRANULOCYTES # BLD AUTO: 0.1 K/UL (ref 0–0.04)
IMM GRANULOCYTES NFR BLD AUTO: 1 % (ref 0–0.5)
KETONES UR QL STRIP.AUTO: NEGATIVE MG/DL
LEUKOCYTE ESTERASE UR QL STRIP.AUTO: ABNORMAL
LIPASE SERPL-CCNC: 150 U/L (ref 73–393)
LYMPHOCYTES # BLD: 2 K/UL (ref 0.8–3.5)
LYMPHOCYTES NFR BLD: 24 % (ref 12–49)
MCH RBC QN AUTO: 25.9 PG (ref 26–34)
MCHC RBC AUTO-ENTMCNC: 31.7 G/DL (ref 30–36.5)
MCV RBC AUTO: 81.7 FL (ref 80–99)
MONOCYTES # BLD: 0.4 K/UL (ref 0–1)
MONOCYTES NFR BLD: 5 % (ref 5–13)
NEUTS SEG # BLD: 5.8 K/UL (ref 1.8–8)
NEUTS SEG NFR BLD: 68 % (ref 32–75)
NITRITE UR QL STRIP.AUTO: NEGATIVE
NRBC # BLD: 0 K/UL (ref 0–0.01)
NRBC BLD-RTO: 0 PER 100 WBC
PH UR STRIP: 7 [PH] (ref 5–8)
PLATELET # BLD AUTO: 429 K/UL (ref 150–400)
PMV BLD AUTO: 8.6 FL (ref 8.9–12.9)
POTASSIUM SERPL-SCNC: 4.1 MMOL/L (ref 3.5–5.1)
PROT SERPL-MCNC: 8.2 G/DL (ref 6.4–8.2)
PROT UR STRIP-MCNC: NEGATIVE MG/DL
RBC # BLD AUTO: 3.28 M/UL (ref 3.8–5.2)
RBC #/AREA URNS HPF: ABNORMAL /HPF (ref 0–5)
SAMPLES BEING HELD,HOLD: NORMAL
SODIUM SERPL-SCNC: 135 MMOL/L (ref 136–145)
SP GR UR REFRACTOMETRY: 1.01 (ref 1–1.03)
UR CULT HOLD, URHOLD: NORMAL
UROBILINOGEN UR QL STRIP.AUTO: 0.2 EU/DL (ref 0.2–1)
WBC # BLD AUTO: 8.3 K/UL (ref 3.6–11)
WBC URNS QL MICRO: ABNORMAL /HPF (ref 0–4)

## 2022-11-07 PROCEDURE — 74177 CT ABD & PELVIS W/CONTRAST: CPT

## 2022-11-07 PROCEDURE — 36415 COLL VENOUS BLD VENIPUNCTURE: CPT

## 2022-11-07 PROCEDURE — 99285 EMERGENCY DEPT VISIT HI MDM: CPT

## 2022-11-07 PROCEDURE — 74011000636 HC RX REV CODE- 636

## 2022-11-07 PROCEDURE — 80053 COMPREHEN METABOLIC PANEL: CPT

## 2022-11-07 PROCEDURE — 81025 URINE PREGNANCY TEST: CPT

## 2022-11-07 PROCEDURE — 85025 COMPLETE CBC W/AUTO DIFF WBC: CPT

## 2022-11-07 PROCEDURE — 83690 ASSAY OF LIPASE: CPT

## 2022-11-07 PROCEDURE — 81001 URINALYSIS AUTO W/SCOPE: CPT

## 2022-11-07 PROCEDURE — 74011250637 HC RX REV CODE- 250/637: Performed by: STUDENT IN AN ORGANIZED HEALTH CARE EDUCATION/TRAINING PROGRAM

## 2022-11-07 RX ORDER — HYDROCODONE BITARTRATE AND ACETAMINOPHEN 5; 325 MG/1; MG/1
1 TABLET ORAL
Status: COMPLETED | OUTPATIENT
Start: 2022-11-07 | End: 2022-11-07

## 2022-11-07 RX ADMIN — IOPAMIDOL 100 ML: 755 INJECTION, SOLUTION INTRAVENOUS at 13:39

## 2022-11-07 RX ADMIN — HYDROCODONE BITARTRATE AND ACETAMINOPHEN 1 TABLET: 5; 325 TABLET ORAL at 15:01

## 2022-11-07 NOTE — ED TRIAGE NOTES
Pt had a stent placed on Friday and is here for possible migration of stent, +abd pain on right side with n/v , low grade fever , also had a fainting episode on Saturday morning

## 2022-11-07 NOTE — DISCHARGE INSTRUCTIONS
These follow-up with your surgeon who placed your pancreatic stent tomorrow. If you have any worsening or concerning symptoms return the emergency department.

## 2022-11-28 ENCOUNTER — HOSPITAL ENCOUNTER (OUTPATIENT)
Dept: CT IMAGING | Age: 32
Discharge: HOME | End: 2022-11-28
Attending: INTERNAL MEDICINE
Payer: COMMERCIAL

## 2022-11-28 DIAGNOSIS — K85.90 ACUTE PANCREATITIS, UNSPECIFIED COMPLICATION STATUS, UNSPECIFIED PANCREATITIS TYPE: ICD-10-CM

## 2022-11-28 DIAGNOSIS — K86.2 CYST OF PANCREAS: ICD-10-CM

## 2022-11-28 PROCEDURE — 74177 CT ABD & PELVIS W/CONTRAST: CPT

## 2022-11-28 PROCEDURE — 74011000636 HC RX REV CODE- 636: Performed by: INTERNAL MEDICINE

## 2022-11-28 RX ADMIN — IOPAMIDOL 100 ML: 755 INJECTION, SOLUTION INTRAVENOUS at 17:27

## 2023-01-01 ENCOUNTER — APPOINTMENT (OUTPATIENT)
Dept: CT IMAGING | Age: 33
End: 2023-01-01
Attending: EMERGENCY MEDICINE
Payer: COMMERCIAL

## 2023-01-01 ENCOUNTER — HOSPITAL ENCOUNTER (EMERGENCY)
Age: 33
Discharge: HOME | End: 2023-01-02
Attending: EMERGENCY MEDICINE
Payer: COMMERCIAL

## 2023-01-01 DIAGNOSIS — R11.2 NAUSEA AND VOMITING, UNSPECIFIED VOMITING TYPE: ICD-10-CM

## 2023-01-01 DIAGNOSIS — R10.9 RIGHT FLANK PAIN: Primary | ICD-10-CM

## 2023-01-01 LAB
ALBUMIN SERPL-MCNC: 4 G/DL (ref 3.5–5)
ALBUMIN/GLOB SERPL: 0.9 {RATIO} (ref 1.1–2.2)
ALP SERPL-CCNC: 98 U/L (ref 45–117)
ALT SERPL-CCNC: 19 U/L (ref 12–78)
ANION GAP SERPL CALC-SCNC: 7 MMOL/L (ref 5–15)
APPEARANCE UR: CLEAR
AST SERPL-CCNC: 9 U/L (ref 15–37)
BACTERIA URNS QL MICRO: NEGATIVE /HPF
BASOPHILS # BLD: 0.1 K/UL (ref 0–0.1)
BASOPHILS NFR BLD: 1 % (ref 0–1)
BILIRUB SERPL-MCNC: 0.2 MG/DL (ref 0.2–1)
BILIRUB UR QL: NEGATIVE
BUN SERPL-MCNC: 10 MG/DL (ref 6–20)
BUN/CREAT SERPL: 13 (ref 12–20)
CALCIUM SERPL-MCNC: 9 MG/DL (ref 8.5–10.1)
CHLORIDE SERPL-SCNC: 105 MMOL/L (ref 97–108)
CO2 SERPL-SCNC: 25 MMOL/L (ref 21–32)
COLOR UR: ABNORMAL
COMMENT, HOLDF: NORMAL
CREAT SERPL-MCNC: 0.77 MG/DL (ref 0.55–1.02)
DIFFERENTIAL METHOD BLD: ABNORMAL
EOSINOPHIL # BLD: 0.1 K/UL (ref 0–0.4)
EOSINOPHIL NFR BLD: 1 % (ref 0–7)
EPITH CASTS URNS QL MICRO: ABNORMAL /LPF
ERYTHROCYTE [DISTWIDTH] IN BLOOD BY AUTOMATED COUNT: 17.7 % (ref 11.5–14.5)
GLOBULIN SER CALC-MCNC: 4.5 G/DL (ref 2–4)
GLUCOSE SERPL-MCNC: 97 MG/DL (ref 65–100)
GLUCOSE UR STRIP.AUTO-MCNC: NEGATIVE MG/DL
HCG UR QL: NEGATIVE
HCT VFR BLD AUTO: 32.7 % (ref 35–47)
HGB BLD-MCNC: 9.6 G/DL (ref 11.5–16)
HGB UR QL STRIP: NEGATIVE
HYALINE CASTS URNS QL MICRO: ABNORMAL /LPF (ref 0–2)
IMM GRANULOCYTES # BLD AUTO: 0 K/UL (ref 0–0.04)
IMM GRANULOCYTES NFR BLD AUTO: 0 % (ref 0–0.5)
KETONES UR QL STRIP.AUTO: NEGATIVE MG/DL
LEUKOCYTE ESTERASE UR QL STRIP.AUTO: NEGATIVE
LIPASE SERPL-CCNC: 319 U/L (ref 73–393)
LYMPHOCYTES # BLD: 4 K/UL (ref 0.8–3.5)
LYMPHOCYTES NFR BLD: 34 % (ref 12–49)
MCH RBC QN AUTO: 20.4 PG (ref 26–34)
MCHC RBC AUTO-ENTMCNC: 29.4 G/DL (ref 30–36.5)
MCV RBC AUTO: 69.6 FL (ref 80–99)
MONOCYTES # BLD: 0.6 K/UL (ref 0–1)
MONOCYTES NFR BLD: 5 % (ref 5–13)
NEUTS SEG # BLD: 7 K/UL (ref 1.8–8)
NEUTS SEG NFR BLD: 59 % (ref 32–75)
NITRITE UR QL STRIP.AUTO: NEGATIVE
NRBC # BLD: 0 K/UL (ref 0–0.01)
NRBC BLD-RTO: 0 PER 100 WBC
PH UR STRIP: 6.5 [PH] (ref 5–8)
PLATELET # BLD AUTO: 675 K/UL (ref 150–400)
PMV BLD AUTO: 8.5 FL (ref 8.9–12.9)
POTASSIUM SERPL-SCNC: 4.1 MMOL/L (ref 3.5–5.1)
PROT SERPL-MCNC: 8.5 G/DL (ref 6.4–8.2)
PROT UR STRIP-MCNC: NEGATIVE MG/DL
RBC # BLD AUTO: 4.7 M/UL (ref 3.8–5.2)
RBC #/AREA URNS HPF: ABNORMAL /HPF (ref 0–5)
RBC MORPH BLD: ABNORMAL
RBC MORPH BLD: ABNORMAL
SAMPLES BEING HELD,HOLD: NORMAL
SODIUM SERPL-SCNC: 137 MMOL/L (ref 136–145)
SP GR UR REFRACTOMETRY: 1.01 (ref 1–1.03)
UR CULT HOLD, URHOLD: NORMAL
UROBILINOGEN UR QL STRIP.AUTO: 0.2 EU/DL (ref 0.2–1)
WBC # BLD AUTO: 11.8 K/UL (ref 3.6–11)
WBC URNS QL MICRO: ABNORMAL /HPF (ref 0–4)

## 2023-01-01 PROCEDURE — 85025 COMPLETE CBC W/AUTO DIFF WBC: CPT

## 2023-01-01 PROCEDURE — 81025 URINE PREGNANCY TEST: CPT

## 2023-01-01 PROCEDURE — 80053 COMPREHEN METABOLIC PANEL: CPT

## 2023-01-01 PROCEDURE — 99285 EMERGENCY DEPT VISIT HI MDM: CPT

## 2023-01-01 PROCEDURE — 74011000636 HC RX REV CODE- 636: Performed by: RADIOLOGY

## 2023-01-01 PROCEDURE — 74177 CT ABD & PELVIS W/CONTRAST: CPT

## 2023-01-01 PROCEDURE — 74011250636 HC RX REV CODE- 250/636: Performed by: EMERGENCY MEDICINE

## 2023-01-01 PROCEDURE — 81001 URINALYSIS AUTO W/SCOPE: CPT

## 2023-01-01 PROCEDURE — 96375 TX/PRO/DX INJ NEW DRUG ADDON: CPT

## 2023-01-01 PROCEDURE — 96374 THER/PROPH/DIAG INJ IV PUSH: CPT

## 2023-01-01 PROCEDURE — 83690 ASSAY OF LIPASE: CPT

## 2023-01-01 RX ORDER — MORPHINE SULFATE 4 MG/ML
4 INJECTION INTRAVENOUS
Status: COMPLETED | OUTPATIENT
Start: 2023-01-01 | End: 2023-01-01

## 2023-01-01 RX ORDER — ONDANSETRON 2 MG/ML
4 INJECTION INTRAMUSCULAR; INTRAVENOUS
Status: COMPLETED | OUTPATIENT
Start: 2023-01-01 | End: 2023-01-01

## 2023-01-01 RX ADMIN — MORPHINE SULFATE 4 MG: 4 INJECTION INTRAVENOUS at 23:20

## 2023-01-01 RX ADMIN — SODIUM CHLORIDE 1000 ML: 9 INJECTION, SOLUTION INTRAVENOUS at 23:00

## 2023-01-01 RX ADMIN — ONDANSETRON 4 MG: 2 INJECTION INTRAMUSCULAR; INTRAVENOUS at 23:18

## 2023-01-02 VITALS
TEMPERATURE: 98 F | SYSTOLIC BLOOD PRESSURE: 113 MMHG | BODY MASS INDEX: 18.05 KG/M2 | WEIGHT: 115 LBS | RESPIRATION RATE: 18 BRPM | OXYGEN SATURATION: 96 % | HEIGHT: 67 IN | DIASTOLIC BLOOD PRESSURE: 75 MMHG | HEART RATE: 104 BPM

## 2023-01-02 PROCEDURE — 96375 TX/PRO/DX INJ NEW DRUG ADDON: CPT

## 2023-01-02 PROCEDURE — 74011250636 HC RX REV CODE- 250/636: Performed by: EMERGENCY MEDICINE

## 2023-01-02 PROCEDURE — 74011000636 HC RX REV CODE- 636: Performed by: RADIOLOGY

## 2023-01-02 RX ORDER — DICYCLOMINE HYDROCHLORIDE 20 MG/1
20 TABLET ORAL
Qty: 30 TABLET | Refills: 0 | Status: SHIPPED | OUTPATIENT
Start: 2023-01-02

## 2023-01-02 RX ORDER — HYDROMORPHONE HYDROCHLORIDE 1 MG/ML
0.5 INJECTION, SOLUTION INTRAMUSCULAR; INTRAVENOUS; SUBCUTANEOUS ONCE
Status: COMPLETED | OUTPATIENT
Start: 2023-01-02 | End: 2023-01-02

## 2023-01-02 RX ORDER — FAMOTIDINE 20 MG/1
20 TABLET, FILM COATED ORAL 2 TIMES DAILY
Qty: 14 TABLET | Refills: 0 | Status: SHIPPED | OUTPATIENT
Start: 2023-01-02 | End: 2023-01-09

## 2023-01-02 RX ORDER — ACETAMINOPHEN 500 MG
1000 TABLET ORAL 3 TIMES DAILY
Qty: 24 TABLET | Refills: 0 | Status: SHIPPED | OUTPATIENT
Start: 2023-01-02 | End: 2023-01-06

## 2023-01-02 RX ORDER — ONDANSETRON 4 MG/1
4 TABLET, ORALLY DISINTEGRATING ORAL
Qty: 30 TABLET | Refills: 0 | Status: SHIPPED | OUTPATIENT
Start: 2023-01-02

## 2023-01-02 RX ADMIN — IOPAMIDOL 100 ML: 755 INJECTION, SOLUTION INTRAVENOUS at 00:05

## 2023-01-02 RX ADMIN — HYDROMORPHONE HYDROCHLORIDE 0.5 MG: 1 INJECTION, SOLUTION INTRAMUSCULAR; INTRAVENOUS; SUBCUTANEOUS at 00:58

## 2023-01-02 NOTE — ED PROVIDER NOTES
69-year-old female presenting ER with right-sided abdominal pain that radiates to the back. Reports having some blood in stools which been intermittent for couple weeks now seen by her GI doctor who wants her to get a CAT scan pending order for outpatient however patient having pain this evening with nausea and vomiting. Presenting ER for evaluation. Patient on Plavix for previous/recent PFO repair. Patient denies any urinary symptoms. No reported any fevers or chills. Patient been taking over-the-counter medication without improvement               Past Medical History:   Diagnosis Date    Adverse effect of anesthesia     Asthma     Heart murmur     PFO (patent foramen ovale)     Raynaud disease     Stroke [facility])     UTI (lower urinary tract infection)        Past Surgical History:   Procedure Laterality Date    HX ORTHOPAEDIC      realignment on left knee    HX WISDOM TEETH EXTRACTION      SD CARDIAC SURG PROCEDURE UNLIST      PFO repair    SD REVISION OF UNSTABLE PATELLA      RIGHT         Family History:   Problem Relation Age of Onset    No Known Problems Mother     No Known Problems Father     Cancer Neg Hx         no known history but limited knowledge       Social History     Socioeconomic History    Marital status:      Spouse name: Not on file    Number of children: Not on file    Years of education: Not on file    Highest education level: Not on file   Occupational History    Not on file   Tobacco Use    Smoking status: Former     Packs/day: 0.25     Types: Cigarettes     Quit date:      Years since quittin.0    Smokeless tobacco: Never   Substance and Sexual Activity    Alcohol use: Not Currently    Drug use: No    Sexual activity: Yes   Other Topics Concern    Not on file   Social History Narrative    No known chemical exposures.      Social Determinants of Health     Financial Resource Strain: Not on file   Food Insecurity: Not on file   Transportation Needs: Not on file Physical Activity: Not on file   Stress: Not on file   Social Connections: Not on file   Intimate Partner Violence: Not on file   Housing Stability: Not on file         ALLERGIES: Imitrex [sumatriptan succinate], Imitrex [sumatriptan], and Robaxin [methocarbamol]    Review of Systems   Constitutional:  Negative for chills and fever. HENT:  Negative for congestion and sore throat. Eyes:  Negative for pain. Respiratory:  Negative for shortness of breath. Cardiovascular:  Negative for chest pain. Gastrointestinal:  Positive for abdominal pain, blood in stool, diarrhea, nausea and vomiting. Genitourinary:  Negative for dysuria and flank pain. Musculoskeletal:  Negative for back pain and neck pain. Skin:  Negative for rash. Neurological:  Negative for dizziness and headaches. All other systems reviewed and are negative. Vitals:    01/01/23 2246 01/02/23 0207 01/02/23 0213   BP: 135/83 113/75    Pulse: (!) 133 (!) 113 (!) 104   Resp: 16 18    Temp: 98 °F (36.7 °C)     SpO2: 99% 96%    Weight: 52.2 kg (115 lb)     Height: 5' 7\" (1.702 m)              Physical Exam  Vitals and nursing note reviewed. Constitutional:       Appearance: She is well-developed. HENT:      Head: Normocephalic. Eyes:      Conjunctiva/sclera: Conjunctivae normal.   Cardiovascular:      Rate and Rhythm: Normal rate and regular rhythm. Pulmonary:      Effort: Pulmonary effort is normal. No respiratory distress. Breath sounds: Normal breath sounds. Abdominal:      General: Bowel sounds are normal.      Palpations: Abdomen is soft. Tenderness: There is generalized abdominal tenderness and tenderness in the right upper quadrant and right lower quadrant. There is no right CVA tenderness, left CVA tenderness, guarding or rebound. Negative signs include Giordano's sign and McBurney's sign. Musculoskeletal:         General: Normal range of motion. Cervical back: Normal range of motion and neck supple. Skin:     General: Skin is warm. Capillary Refill: Capillary refill takes less than 2 seconds. Findings: No rash. Neurological:      Mental Status: She is alert and oriented to person, place, and time. Comments: No gross motor or sensory deficits        MDM  Number of Diagnoses or Management Options  Nausea and vomiting, unspecified vomiting type  Right flank pain  Diagnosis management comments: Patient presenting ER with flank pain/abdominal pain. Reports having some blood in the stools for couple weeks. Patient is on Plavix. Concern for possible anemia versus infectious etiology versus bowel obstruction versus pyelonephritis. No pain in the pelvic region no vaginal bleed discharged at this time no concern for ovarian etiology. Ordered CAT scan and lab work. Patient has anemia which appears to be chronic improved from previous. Normal LFTs and lipase. Urine without any evidence of urinary tract infection. CAT scan with normal-appearing gallbladder. Appendix could not be visualized but patient has no rebound tenderness no localized pain in the right lower quadrant. No clear source of patient's symptoms. Advised to follow-up with her GI doctor.   We will start patient on Bentyl, Pepcid and Zofran       Amount and/or Complexity of Data Reviewed  Clinical lab tests: reviewed  Tests in the radiology section of CPT®: reviewed  Decide to obtain previous medical records or to obtain history from someone other than the patient: yes             Procedures          Recent Results (from the past 24 hour(s))   CBC WITH AUTOMATED DIFF    Collection Time: 01/01/23 10:51 PM   Result Value Ref Range    WBC 11.8 (H) 3.6 - 11.0 K/uL    RBC 4.70 3.80 - 5.20 M/uL    HGB 9.6 (L) 11.5 - 16.0 g/dL    HCT 32.7 (L) 35.0 - 47.0 %    MCV 69.6 (L) 80.0 - 99.0 FL    MCH 20.4 (L) 26.0 - 34.0 PG    MCHC 29.4 (L) 30.0 - 36.5 g/dL    RDW 17.7 (H) 11.5 - 14.5 %    PLATELET 624 (H) 091 - 400 K/uL    MPV 8.5 (L) 8.9 - 12.9 FL    NRBC 0.0 0  WBC    ABSOLUTE NRBC 0.00 0.00 - 0.01 K/uL    NEUTROPHILS 59 32 - 75 %    LYMPHOCYTES 34 12 - 49 %    MONOCYTES 5 5 - 13 %    EOSINOPHILS 1 0 - 7 %    BASOPHILS 1 0 - 1 %    IMMATURE GRANULOCYTES 0 0.0 - 0.5 %    ABS. NEUTROPHILS 7.0 1.8 - 8.0 K/UL    ABS. LYMPHOCYTES 4.0 (H) 0.8 - 3.5 K/UL    ABS. MONOCYTES 0.6 0.0 - 1.0 K/UL    ABS. EOSINOPHILS 0.1 0.0 - 0.4 K/UL    ABS. BASOPHILS 0.1 0.0 - 0.1 K/UL    ABS. IMM. GRANS. 0.0 0.00 - 0.04 K/UL    DF SMEAR SCANNED      RBC COMMENTS MICROCYTOSIS  2+        RBC COMMENTS HYPOCHROMIA  1+       METABOLIC PANEL, COMPREHENSIVE    Collection Time: 01/01/23 10:51 PM   Result Value Ref Range    Sodium 137 136 - 145 mmol/L    Potassium 4.1 3.5 - 5.1 mmol/L    Chloride 105 97 - 108 mmol/L    CO2 25 21 - 32 mmol/L    Anion gap 7 5 - 15 mmol/L    Glucose 97 65 - 100 mg/dL    BUN 10 6 - 20 MG/DL    Creatinine 0.77 0.55 - 1.02 MG/DL    BUN/Creatinine ratio 13 12 - 20      eGFR >60 >60 ml/min/1.73m2    Calcium 9.0 8.5 - 10.1 MG/DL    Bilirubin, total 0.2 0.2 - 1.0 MG/DL    ALT (SGPT) 19 12 - 78 U/L    AST (SGOT) 9 (L) 15 - 37 U/L    Alk. phosphatase 98 45 - 117 U/L    Protein, total 8.5 (H) 6.4 - 8.2 g/dL    Albumin 4.0 3.5 - 5.0 g/dL    Globulin 4.5 (H) 2.0 - 4.0 g/dL    A-G Ratio 0.9 (L) 1.1 - 2.2     LIPASE    Collection Time: 01/01/23 10:51 PM   Result Value Ref Range    Lipase 319 73 - 393 U/L   SAMPLES BEING HELD    Collection Time: 01/01/23 10:51 PM   Result Value Ref Range    SAMPLES BEING HELD RED,BLUE     COMMENT        Add-on orders for these samples will be processed based on acceptable specimen integrity and analyte stability, which may vary by analyte.    URINALYSIS W/MICROSCOPIC    Collection Time: 01/01/23 11:11 PM   Result Value Ref Range    Color YELLOW/STRAW      Appearance CLEAR CLEAR      Specific gravity 1.010 1.003 - 1.030      pH (UA) 6.5 5.0 - 8.0      Protein Negative NEG mg/dL    Glucose Negative NEG mg/dL    Ketone Negative NEG mg/dL    Bilirubin Negative NEG      Blood Negative NEG      Urobilinogen 0.2 0.2 - 1.0 EU/dL    Nitrites Negative NEG      Leukocyte Esterase Negative NEG      WBC 0-4 0 - 4 /hpf    RBC 0-5 0 - 5 /hpf    Epithelial cells MODERATE (A) FEW /lpf    Bacteria Negative NEG /hpf    Hyaline cast 0-2 0 - 2 /lpf   URINE CULTURE HOLD SAMPLE    Collection Time: 01/01/23 11:11 PM    Specimen: Urine   Result Value Ref Range    Urine culture hold        Urine on hold in Microbiology dept for 2 days. If unpreserved urine is submitted, it cannot be used for addtional testing after 24 hours, recollection will be required. HCG URINE, QL. - POC    Collection Time: 01/01/23 11:13 PM   Result Value Ref Range    Pregnancy test,urine (POC) Negative NEG         CT ABD PELV W CONT    Result Date: 1/2/2023  EXAM: CT ABD PELV W CONT INDICATION: abd pain, RLQ COMPARISON: November 28, 2022 CONTRAST: 100 mL of Isovue-370. ORAL CONTRAST: None TECHNIQUE: Following the uneventful intravenous administration of contrast, thin axial images were obtained through the abdomen and pelvis. Coronal and sagittal reconstructions were generated. CT dose reduction was achieved through use of a standardized protocol tailored for this examination and automatic exposure control for dose modulation. FINDINGS: LOWER THORAX: No significant abnormality in the incidentally imaged lower chest. LIVER: No mass. BILIARY TREE: Gallbladder surgically absent. CBD is not dilated. SPLEEN: within normal limits. PANCREAS: Unchanged pancreatic ductal dilatation. No peripancreatic fluid collection. ADRENALS: Unremarkable. KIDNEYS: No mass, calculus, or hydronephrosis. STOMACH: Cyst gastrostomy tube is unchanged in position. SMALL BOWEL: No dilatation or wall thickening. COLON: No dilatation or wall thickening. APPENDIX: Not visualized. PERITONEUM: No ascites or pneumoperitoneum. RETROPERITONEUM: No lymphadenopathy or aortic aneurysm. REPRODUCTIVE ORGANS: Unremarkable.  URINARY BLADDER: No mass or calculus. BONES: No destructive bone lesion. ABDOMINAL WALL: No mass or hernia. ADDITIONAL COMMENTS: N/A     No evidence of acute abdominal or pelvic process.

## 2023-01-02 NOTE — ED TRIAGE NOTES
Pt arrives to ED for right sided abd pain that radiates into back and blood in stool. Pt saw PCP last week and ordered CT scan. Reports nausea without vomiting.       Takes plavix

## 2023-01-17 ENCOUNTER — TRANSCRIBE ORDER (OUTPATIENT)
Dept: SCHEDULING | Age: 33
End: 2023-01-17

## 2023-01-17 DIAGNOSIS — I95.9 HYPOTENSION: ICD-10-CM

## 2023-01-17 DIAGNOSIS — K92.1 HEMATOCHEZIA: Primary | ICD-10-CM

## 2023-01-17 DIAGNOSIS — K92.1 MELENA: ICD-10-CM

## 2023-01-17 DIAGNOSIS — R00.0 TACHYCARDIA: ICD-10-CM

## 2023-01-24 ENCOUNTER — HOSPITAL ENCOUNTER (OUTPATIENT)
Dept: CT IMAGING | Age: 33
End: 2023-01-24

## 2023-01-30 ENCOUNTER — APPOINTMENT (OUTPATIENT)
Dept: ULTRASOUND IMAGING | Age: 33
End: 2023-01-30
Attending: EMERGENCY MEDICINE
Payer: COMMERCIAL

## 2023-01-30 ENCOUNTER — HOSPITAL ENCOUNTER (EMERGENCY)
Age: 33
Discharge: HOME | End: 2023-01-31
Attending: EMERGENCY MEDICINE
Payer: COMMERCIAL

## 2023-01-30 DIAGNOSIS — N83.201 CYST OF RIGHT OVARY: ICD-10-CM

## 2023-01-30 DIAGNOSIS — D25.9 UTERINE LEIOMYOMA, UNSPECIFIED LOCATION: ICD-10-CM

## 2023-01-30 DIAGNOSIS — R10.31 RLQ ABDOMINAL PAIN: Primary | ICD-10-CM

## 2023-01-30 LAB
ALBUMIN SERPL-MCNC: 4.1 G/DL (ref 3.5–5)
ALBUMIN/GLOB SERPL: 1 (ref 1.1–2.2)
ALP SERPL-CCNC: 96 U/L (ref 45–117)
ALT SERPL-CCNC: 13 U/L (ref 12–78)
ANION GAP SERPL CALC-SCNC: 8 MMOL/L (ref 5–15)
AST SERPL-CCNC: 7 U/L (ref 15–37)
BASOPHILS # BLD: 0.1 K/UL (ref 0–0.1)
BASOPHILS NFR BLD: 1 % (ref 0–1)
BILIRUB SERPL-MCNC: 0.2 MG/DL (ref 0.2–1)
BUN SERPL-MCNC: 9 MG/DL (ref 6–20)
BUN/CREAT SERPL: 10 (ref 12–20)
CALCIUM SERPL-MCNC: 8.9 MG/DL (ref 8.5–10.1)
CHLORIDE SERPL-SCNC: 104 MMOL/L (ref 97–108)
CO2 SERPL-SCNC: 26 MMOL/L (ref 21–32)
COMMENT, HOLDF: NORMAL
CREAT SERPL-MCNC: 0.9 MG/DL (ref 0.55–1.02)
DIFFERENTIAL METHOD BLD: ABNORMAL
EOSINOPHIL # BLD: 0.1 K/UL (ref 0–0.4)
EOSINOPHIL NFR BLD: 1 % (ref 0–7)
ERYTHROCYTE [DISTWIDTH] IN BLOOD BY AUTOMATED COUNT: 18.7 % (ref 11.5–14.5)
GLOBULIN SER CALC-MCNC: 4.3 G/DL (ref 2–4)
GLUCOSE SERPL-MCNC: 86 MG/DL (ref 65–100)
HCT VFR BLD AUTO: 34 % (ref 35–47)
HGB BLD-MCNC: 9.7 G/DL (ref 11.5–16)
IMM GRANULOCYTES # BLD AUTO: 0 K/UL (ref 0–0.04)
IMM GRANULOCYTES NFR BLD AUTO: 0 % (ref 0–0.5)
LIPASE SERPL-CCNC: 330 U/L (ref 73–393)
LYMPHOCYTES # BLD: 3.3 K/UL (ref 0.8–3.5)
LYMPHOCYTES NFR BLD: 28 % (ref 12–49)
MCH RBC QN AUTO: 18.9 PG (ref 26–34)
MCHC RBC AUTO-ENTMCNC: 28.5 G/DL (ref 30–36.5)
MCV RBC AUTO: 66.1 FL (ref 80–99)
MONOCYTES # BLD: 0.6 K/UL (ref 0–1)
MONOCYTES NFR BLD: 5 % (ref 5–13)
NEUTS SEG # BLD: 7.6 K/UL (ref 1.8–8)
NEUTS SEG NFR BLD: 65 % (ref 32–75)
NRBC # BLD: 0 K/UL (ref 0–0.01)
NRBC BLD-RTO: 0 PER 100 WBC
PLATELET # BLD AUTO: 576 K/UL (ref 150–400)
PMV BLD AUTO: 8.4 FL (ref 8.9–12.9)
POTASSIUM SERPL-SCNC: 4 MMOL/L (ref 3.5–5.1)
PROT SERPL-MCNC: 8.4 G/DL (ref 6.4–8.2)
RBC # BLD AUTO: 5.14 M/UL (ref 3.8–5.2)
RBC MORPH BLD: ABNORMAL
SAMPLES BEING HELD,HOLD: NORMAL
SODIUM SERPL-SCNC: 138 MMOL/L (ref 136–145)
WBC # BLD AUTO: 11.7 K/UL (ref 3.6–11)

## 2023-01-30 PROCEDURE — 36415 COLL VENOUS BLD VENIPUNCTURE: CPT

## 2023-01-30 PROCEDURE — 85025 COMPLETE CBC W/AUTO DIFF WBC: CPT

## 2023-01-30 PROCEDURE — 76856 US EXAM PELVIC COMPLETE: CPT

## 2023-01-30 PROCEDURE — 83690 ASSAY OF LIPASE: CPT

## 2023-01-30 PROCEDURE — 74011250636 HC RX REV CODE- 250/636: Performed by: EMERGENCY MEDICINE

## 2023-01-30 PROCEDURE — 80053 COMPREHEN METABOLIC PANEL: CPT

## 2023-01-30 PROCEDURE — 96374 THER/PROPH/DIAG INJ IV PUSH: CPT

## 2023-01-30 PROCEDURE — 99284 EMERGENCY DEPT VISIT MOD MDM: CPT

## 2023-01-30 PROCEDURE — 76830 TRANSVAGINAL US NON-OB: CPT

## 2023-01-30 PROCEDURE — 96375 TX/PRO/DX INJ NEW DRUG ADDON: CPT

## 2023-01-30 RX ORDER — ACETAMINOPHEN 500 MG
1000 TABLET ORAL 3 TIMES DAILY
Qty: 24 TABLET | Refills: 0 | Status: SHIPPED | OUTPATIENT
Start: 2023-01-30 | End: 2023-02-03

## 2023-01-30 RX ORDER — KETOROLAC TROMETHAMINE 30 MG/ML
15 INJECTION, SOLUTION INTRAMUSCULAR; INTRAVENOUS
Status: COMPLETED | OUTPATIENT
Start: 2023-01-30 | End: 2023-01-30

## 2023-01-30 RX ORDER — ONDANSETRON 4 MG/1
4 TABLET, ORALLY DISINTEGRATING ORAL
Qty: 30 TABLET | Refills: 0 | Status: SHIPPED | OUTPATIENT
Start: 2023-01-30

## 2023-01-30 RX ORDER — LANOLIN ALCOHOL/MO/W.PET/CERES
325 CREAM (GRAM) TOPICAL
Qty: 30 TABLET | Refills: 1 | Status: SHIPPED | OUTPATIENT
Start: 2023-01-30 | End: 2023-03-01

## 2023-01-30 RX ORDER — IBUPROFEN 600 MG/1
600 TABLET ORAL
Qty: 30 TABLET | Refills: 0 | Status: SHIPPED | OUTPATIENT
Start: 2023-01-30 | End: 2023-02-03

## 2023-01-30 RX ORDER — MORPHINE SULFATE 2 MG/ML
2 INJECTION, SOLUTION INTRAMUSCULAR; INTRAVENOUS
Status: COMPLETED | OUTPATIENT
Start: 2023-01-30 | End: 2023-01-30

## 2023-01-30 RX ORDER — ONDANSETRON 2 MG/ML
4 INJECTION INTRAMUSCULAR; INTRAVENOUS
Status: COMPLETED | OUTPATIENT
Start: 2023-01-30 | End: 2023-01-30

## 2023-01-30 RX ORDER — OXYCODONE HYDROCHLORIDE 5 MG/1
5 TABLET ORAL
Qty: 10 TABLET | Refills: 0 | Status: SHIPPED | OUTPATIENT
Start: 2023-01-30 | End: 2023-02-02

## 2023-01-30 RX ADMIN — ONDANSETRON 4 MG: 2 INJECTION INTRAMUSCULAR; INTRAVENOUS at 23:09

## 2023-01-30 RX ADMIN — KETOROLAC TROMETHAMINE 15 MG: 30 INJECTION, SOLUTION INTRAMUSCULAR at 23:10

## 2023-01-30 RX ADMIN — SODIUM CHLORIDE 1000 ML: 9 INJECTION, SOLUTION INTRAVENOUS at 23:11

## 2023-01-30 RX ADMIN — MORPHINE SULFATE 2 MG: 2 INJECTION, SOLUTION INTRAMUSCULAR; INTRAVENOUS at 23:11

## 2023-01-31 VITALS
SYSTOLIC BLOOD PRESSURE: 133 MMHG | HEART RATE: 93 BPM | TEMPERATURE: 98.5 F | HEIGHT: 67 IN | OXYGEN SATURATION: 100 % | RESPIRATION RATE: 16 BRPM | BODY MASS INDEX: 20.4 KG/M2 | DIASTOLIC BLOOD PRESSURE: 89 MMHG | WEIGHT: 130 LBS

## 2023-01-31 LAB
APPEARANCE UR: CLEAR
BACTERIA URNS QL MICRO: NEGATIVE /HPF
BILIRUB UR QL: NEGATIVE
COLOR UR: NORMAL
EPITH CASTS URNS QL MICRO: NORMAL /LPF
GLUCOSE UR STRIP.AUTO-MCNC: NEGATIVE MG/DL
HCG UR QL: NEGATIVE
HGB UR QL STRIP: NEGATIVE
HYALINE CASTS URNS QL MICRO: NORMAL /LPF (ref 0–2)
KETONES UR QL STRIP.AUTO: NEGATIVE MG/DL
LEUKOCYTE ESTERASE UR QL STRIP.AUTO: NEGATIVE
NITRITE UR QL STRIP.AUTO: NEGATIVE
PH UR STRIP: 7.5 (ref 5–8)
PROT UR STRIP-MCNC: NEGATIVE MG/DL
RBC #/AREA URNS HPF: NORMAL /HPF (ref 0–5)
SP GR UR REFRACTOMETRY: 1.01 (ref 1–1.03)
UR CULT HOLD, URHOLD: NORMAL
UROBILINOGEN UR QL STRIP.AUTO: 0.2 EU/DL (ref 0.2–1)
WBC URNS QL MICRO: NORMAL /HPF (ref 0–4)

## 2023-01-31 PROCEDURE — 81001 URINALYSIS AUTO W/SCOPE: CPT

## 2023-01-31 PROCEDURE — 81025 URINE PREGNANCY TEST: CPT

## 2023-01-31 PROCEDURE — 74011250637 HC RX REV CODE- 250/637: Performed by: EMERGENCY MEDICINE

## 2023-01-31 PROCEDURE — 36415 COLL VENOUS BLD VENIPUNCTURE: CPT

## 2023-01-31 RX ORDER — OXYCODONE AND ACETAMINOPHEN 5; 325 MG/1; MG/1
1 TABLET ORAL ONCE
Status: COMPLETED | OUTPATIENT
Start: 2023-01-31 | End: 2023-01-31

## 2023-01-31 RX ADMIN — OXYCODONE AND ACETAMINOPHEN 1 TABLET: 5; 325 TABLET ORAL at 00:45

## 2023-01-31 NOTE — ED NOTES
I have reviewed discharge instructions with the patient. The patient verbalized understanding. Current Discharge Medication List        START taking these medications    Details   ibuprofen (MOTRIN) 600 mg tablet Take 1 Tablet by mouth every six (6) hours as needed for Pain for up to 4 days. Indications: minor musculoskeletal injury, pain  Qty: 30 Tablet, Refills: 0  Start date: 1/30/2023, End date: 2/3/2023      acetaminophen (Tylenol Extra Strength) 500 mg tablet Take 2 Tablets by mouth three (3) times daily for 4 days. Indications: pain  Qty: 24 Tablet, Refills: 0  Start date: 1/30/2023, End date: 2/3/2023      ondansetron (ZOFRAN ODT) 4 mg disintegrating tablet Take 1 Tablet by mouth every eight (8) hours as needed for Nausea or Vomiting. Qty: 30 Tablet, Refills: 0  Start date: 1/30/2023      oxyCODONE IR (ROXICODONE) 5 mg immediate release tablet Take 1 Tablet by mouth every six (6) hours as needed for Pain for up to 3 days. Max Daily Amount: 20 mg. Indications: pain  Qty: 10 Tablet, Refills: 0  Start date: 1/30/2023, End date: 2/2/2023    Associated Diagnoses: Cyst of right ovary      ferrous sulfate 325 mg (65 mg iron) tablet Take 1 Tablet by mouth Daily (before breakfast) for 30 days.   Qty: 30 Tablet, Refills: 1  Start date: 1/30/2023, End date: 3/1/2023           Pt amb with cane to waiting room

## 2023-01-31 NOTE — ED PROVIDER NOTES
70-year-old female presenting ER with complaints of right lower quadrant abdominal pain radiates to the back. Surgery nausea without episodes of vomiting. No diarrhea constipation. Patient reports some urinary symptoms. Patient has history of chronic ongoing right flank pain has been seen several times for this. His history of fibroids, appendectomy, cholecystectomy, being followed by GI being scheduled for endoscopy. Patient has history of PFO on Plavix. Symptoms worsened for the last couple days. No history of kidney stones. Denies any blood in the urine. No report of any blood in the stools. Recent CAT scan several weeks ago for similar complaints that was unremarkable at that time. Past Medical History:   Diagnosis Date    Adverse effect of anesthesia     Asthma     Heart murmur     PFO (patent foramen ovale)     Raynaud disease     Stroke [facility])     UTI (lower urinary tract infection)        Past Surgical History:   Procedure Laterality Date    HX ORTHOPAEDIC      realignment on left knee    HX WISDOM TEETH EXTRACTION      DE RCNSTJ DISLOCATING PATELLA      RIGHT    DE UNLISTED PROCEDURE CARDIAC SURGERY      PFO repair         Family History:   Problem Relation Age of Onset    No Known Problems Mother     No Known Problems Father     Cancer Neg Hx         no known history but limited knowledge       Social History     Socioeconomic History    Marital status:      Spouse name: Not on file    Number of children: Not on file    Years of education: Not on file    Highest education level: Not on file   Occupational History    Not on file   Tobacco Use    Smoking status: Former     Packs/day: 0.25     Types: Cigarettes     Quit date:      Years since quittin.0    Smokeless tobacco: Never   Substance and Sexual Activity    Alcohol use: Not Currently    Drug use: No    Sexual activity: Yes   Other Topics Concern    Not on file   Social History Narrative    No known chemical exposures. Social Determinants of Health     Financial Resource Strain: Not on file   Food Insecurity: Not on file   Transportation Needs: Not on file   Physical Activity: Not on file   Stress: Not on file   Social Connections: Not on file   Intimate Partner Violence: Not on file   Housing Stability: Not on file         ALLERGIES: Imitrex [sumatriptan succinate], Imitrex [sumatriptan], and Robaxin [methocarbamol]    Review of Systems   Constitutional:  Negative for chills and fever. HENT:  Negative for congestion and sore throat. Eyes:  Negative for pain. Respiratory:  Negative for shortness of breath. Cardiovascular:  Negative for chest pain. Gastrointestinal:  Positive for abdominal pain and nausea. Negative for anal bleeding, blood in stool, constipation, diarrhea and vomiting. Genitourinary:  Positive for dysuria and pelvic pain. Negative for flank pain. Musculoskeletal:  Negative for back pain and neck pain. Skin:  Negative for rash. Neurological:  Negative for dizziness and headaches. All other systems reviewed and are negative. Vitals:    01/30/23 2156 01/30/23 2158   BP: 119/86    Pulse: (!) 119    Resp: 15    Temp: 98.7 °F (37.1 °C)    SpO2:  99%   Weight: 59 kg (130 lb)    Height: 5' 7\" (1.702 m)             Physical Exam  Vitals and nursing note reviewed. Constitutional:       Appearance: She is well-developed. HENT:      Head: Normocephalic. Eyes:      Conjunctiva/sclera: Conjunctivae normal.   Cardiovascular:      Rate and Rhythm: Regular rhythm. Tachycardia present. Pulmonary:      Effort: Pulmonary effort is normal. No respiratory distress. Breath sounds: Normal breath sounds. Abdominal:      General: Bowel sounds are normal.      Palpations: Abdomen is soft. Tenderness: There is abdominal tenderness in the right lower quadrant. There is no right CVA tenderness, left CVA tenderness, guarding or rebound. Negative signs include Giordano's sign and Rovsing's sign. Musculoskeletal:         General: Normal range of motion. Cervical back: Normal range of motion and neck supple. Skin:     General: Skin is warm. Capillary Refill: Capillary refill takes less than 2 seconds. Findings: No rash. Neurological:      Mental Status: She is alert and oriented to person, place, and time. Comments: No gross motor or sensory deficits        Medical Decision Making  45-year-old female presenting ER with right lower quadrant abdominal pain/pelvic pain. No vaginal bleeding or discharge due to reports of dysuria. Has history of appendectomy and cholecystectomy. Known history of uterine fibroids. Patient has nonacute abdomen. Has been seen previously for abdominal pain has had recent CAT scan is unremarkable. On evaluation patient has lower pain that radiates to the back. Kidney stone versus ovarian etiology versus pain from her uterine fibroid. No diarrhea constipation unlikely constipation or colitis or diverticulitis since there is no evidence of diverticulosis on previous imaging. Ordered labs and electrolytes and ultrasound. Ultrasound complex ovarian cyst over location of patient's pain. Urine with no signs of infection no leukocytosis normal lipase. No signs of ovarian torsion. Patient's pain controlled with treatment in ER. At this time with nonacute abdomen and pain located over the ovary with ovarian cyst and no signs or symptoms of small bowel obstruction diagnosed with ovarian cyst with uterine fibroid. Discussed symptomatic treatment and need to follow-up with her OB/GYN. Amount and/or Complexity of Data Reviewed  Labs: ordered. Decision-making details documented in ED Course. Radiology: ordered. Risk  OTC drugs. Prescription drug management.       ED Course as of 01/31/23 0101   Mon Jan 30, 2023   2309 HGB(!): 9.7  Chronic iron def anemia, stable [ZD]      ED Course User Index  [ZD] Chaparro Lott MD       Procedures

## 2023-02-05 ENCOUNTER — APPOINTMENT (OUTPATIENT)
Dept: CT IMAGING | Age: 33
End: 2023-02-05
Attending: EMERGENCY MEDICINE
Payer: COMMERCIAL

## 2023-02-05 ENCOUNTER — HOSPITAL ENCOUNTER (EMERGENCY)
Age: 33
Discharge: HOME | End: 2023-02-05
Attending: EMERGENCY MEDICINE
Payer: COMMERCIAL

## 2023-02-05 VITALS
RESPIRATION RATE: 18 BRPM | SYSTOLIC BLOOD PRESSURE: 115 MMHG | HEIGHT: 67 IN | DIASTOLIC BLOOD PRESSURE: 79 MMHG | OXYGEN SATURATION: 100 % | WEIGHT: 130 LBS | BODY MASS INDEX: 20.4 KG/M2 | TEMPERATURE: 98.3 F | HEART RATE: 102 BPM

## 2023-02-05 DIAGNOSIS — R10.31 ABDOMINAL PAIN, RIGHT LOWER QUADRANT: Primary | ICD-10-CM

## 2023-02-05 LAB
ALBUMIN SERPL-MCNC: 4.2 G/DL (ref 3.5–5)
ALBUMIN/GLOB SERPL: 0.9 (ref 1.1–2.2)
ALP SERPL-CCNC: 84 U/L (ref 45–117)
ALT SERPL-CCNC: 12 U/L (ref 12–78)
ANION GAP SERPL CALC-SCNC: 8 MMOL/L (ref 5–15)
APPEARANCE UR: CLEAR
AST SERPL-CCNC: 11 U/L (ref 15–37)
BACTERIA URNS QL MICRO: NEGATIVE /HPF
BASOPHILS # BLD: 0.1 K/UL (ref 0–0.1)
BASOPHILS NFR BLD: 1 % (ref 0–1)
BILIRUB SERPL-MCNC: 0.1 MG/DL (ref 0.2–1)
BILIRUB UR QL: NEGATIVE
BUN SERPL-MCNC: 9 MG/DL (ref 6–20)
BUN/CREAT SERPL: 9 (ref 12–20)
CALCIUM SERPL-MCNC: 8.9 MG/DL (ref 8.5–10.1)
CHLORIDE SERPL-SCNC: 107 MMOL/L (ref 97–108)
CO2 SERPL-SCNC: 24 MMOL/L (ref 21–32)
COLOR UR: ABNORMAL
COMMENT, HOLDF: NORMAL
CREAT SERPL-MCNC: 0.95 MG/DL (ref 0.55–1.02)
DIFFERENTIAL METHOD BLD: ABNORMAL
EOSINOPHIL # BLD: 0.1 K/UL (ref 0–0.4)
EOSINOPHIL NFR BLD: 1 % (ref 0–7)
EPITH CASTS URNS QL MICRO: ABNORMAL /LPF
ERYTHROCYTE [DISTWIDTH] IN BLOOD BY AUTOMATED COUNT: 21.6 % (ref 11.5–14.5)
GLOBULIN SER CALC-MCNC: 4.5 G/DL (ref 2–4)
GLUCOSE SERPL-MCNC: 91 MG/DL (ref 65–100)
GLUCOSE UR STRIP.AUTO-MCNC: NEGATIVE MG/DL
HCG UR QL: NEGATIVE
HCT VFR BLD AUTO: 38.3 % (ref 35–47)
HGB BLD-MCNC: 11 G/DL (ref 11.5–16)
HGB UR QL STRIP: NEGATIVE
HYALINE CASTS URNS QL MICRO: ABNORMAL /LPF (ref 0–2)
IMM GRANULOCYTES # BLD AUTO: 0.1 K/UL (ref 0–0.04)
IMM GRANULOCYTES NFR BLD AUTO: 1 % (ref 0–0.5)
KETONES UR QL STRIP.AUTO: NEGATIVE MG/DL
LEUKOCYTE ESTERASE UR QL STRIP.AUTO: ABNORMAL
LIPASE SERPL-CCNC: 190 U/L (ref 73–393)
LYMPHOCYTES # BLD: 3.6 K/UL (ref 0.8–3.5)
LYMPHOCYTES NFR BLD: 33 % (ref 12–49)
MCH RBC QN AUTO: 19.2 PG (ref 26–34)
MCHC RBC AUTO-ENTMCNC: 28.7 G/DL (ref 30–36.5)
MCV RBC AUTO: 66.8 FL (ref 80–99)
MONOCYTES # BLD: 0.8 K/UL (ref 0–1)
MONOCYTES NFR BLD: 7 % (ref 5–13)
NEUTS SEG # BLD: 6.2 K/UL (ref 1.8–8)
NEUTS SEG NFR BLD: 57 % (ref 32–75)
NITRITE UR QL STRIP.AUTO: NEGATIVE
NRBC # BLD: 0 K/UL (ref 0–0.01)
NRBC BLD-RTO: 0 PER 100 WBC
PH UR STRIP: 6.5 (ref 5–8)
PLATELET # BLD AUTO: 615 K/UL (ref 150–400)
PMV BLD AUTO: 8.6 FL (ref 8.9–12.9)
POTASSIUM SERPL-SCNC: 4 MMOL/L (ref 3.5–5.1)
PROT SERPL-MCNC: 8.7 G/DL (ref 6.4–8.2)
PROT UR STRIP-MCNC: NEGATIVE MG/DL
RBC # BLD AUTO: 5.73 M/UL (ref 3.8–5.2)
RBC #/AREA URNS HPF: ABNORMAL /HPF (ref 0–5)
RBC MORPH BLD: ABNORMAL
SAMPLES BEING HELD,HOLD: NORMAL
SODIUM SERPL-SCNC: 139 MMOL/L (ref 136–145)
SP GR UR REFRACTOMETRY: 1.01 (ref 1–1.03)
UR CULT HOLD, URHOLD: NORMAL
UROBILINOGEN UR QL STRIP.AUTO: 0.2 EU/DL (ref 0.2–1)
WBC # BLD AUTO: 10.9 K/UL (ref 3.6–11)
WBC URNS QL MICRO: ABNORMAL /HPF (ref 0–4)

## 2023-02-05 PROCEDURE — 80053 COMPREHEN METABOLIC PANEL: CPT

## 2023-02-05 PROCEDURE — 99285 EMERGENCY DEPT VISIT HI MDM: CPT

## 2023-02-05 PROCEDURE — 81025 URINE PREGNANCY TEST: CPT

## 2023-02-05 PROCEDURE — 36415 COLL VENOUS BLD VENIPUNCTURE: CPT

## 2023-02-05 PROCEDURE — 74011250636 HC RX REV CODE- 250/636: Performed by: EMERGENCY MEDICINE

## 2023-02-05 PROCEDURE — 83690 ASSAY OF LIPASE: CPT

## 2023-02-05 PROCEDURE — 74177 CT ABD & PELVIS W/CONTRAST: CPT

## 2023-02-05 PROCEDURE — 74011000636 HC RX REV CODE- 636: Performed by: RADIOLOGY

## 2023-02-05 PROCEDURE — 81001 URINALYSIS AUTO W/SCOPE: CPT

## 2023-02-05 PROCEDURE — 85025 COMPLETE CBC W/AUTO DIFF WBC: CPT

## 2023-02-05 RX ORDER — SODIUM CHLORIDE 0.9 % (FLUSH) 0.9 %
5-40 SYRINGE (ML) INJECTION AS NEEDED
Status: DISCONTINUED | OUTPATIENT
Start: 2023-02-05 | End: 2023-02-06 | Stop reason: HOSPADM

## 2023-02-05 RX ORDER — SODIUM CHLORIDE 0.9 % (FLUSH) 0.9 %
5-40 SYRINGE (ML) INJECTION EVERY 8 HOURS
Status: DISCONTINUED | OUTPATIENT
Start: 2023-02-05 | End: 2023-02-06 | Stop reason: HOSPADM

## 2023-02-05 RX ADMIN — IOPAMIDOL 100 ML: 755 INJECTION, SOLUTION INTRAVENOUS at 20:59

## 2023-02-05 RX ADMIN — SODIUM CHLORIDE 1000 ML: 9 INJECTION, SOLUTION INTRAVENOUS at 20:07

## 2023-02-06 NOTE — ED TRIAGE NOTES
Pt presents to the ED with c/o abdominal pain, nausea, blurred vision and diarrhea. Pt states that she has been experiencing RLQ abdominal pain that started 2 days ago. Pt states that today she has starting experiencing diarrhea and is unable to keep anything down. Pt reports some episodes of blurred vision and generalized weakness. Pt states she has a hx of a stroke which has left her with L sided deficits. Pt denies any unilateral weakness, slurred speech, facial drooping. Pt has a hx of chronic pancreatitis and CVA.

## 2023-02-06 NOTE — ED PROVIDER NOTES
History of chronic pancreatitis, asthma, Raynaud's disease, stroke. She presents with complaints of right lower quadrant abdominal pain. She has had it for the past year. However, it has worsened over the past 2 days. She has had nausea but no vomiting. Her appetite and p.o. intake have been decreased. She has had diarrhea x2 today. She feels generally weak. She reports a history of pancreatic debridement surgery and pseudocysts removal.  She is status post appendectomy and cholecystectomy. She has an established GI doctor, neurologist and GYN doctor. She was seen here about 5 days ago and was diagnosed with a fibroid and right ovarian cyst.  She states that she followed up with her GYN doctor and was told that her pain was unlikely from those things.        Past Medical History:   Diagnosis Date    Adverse effect of anesthesia     Asthma     Heart murmur     PFO (patent foramen ovale)     Raynaud disease     Stroke [facility])     UTI (lower urinary tract infection)        Past Surgical History:   Procedure Laterality Date    HX ORTHOPAEDIC      realignment on left knee    HX WISDOM TEETH EXTRACTION      SC RCNSTJ DISLOCATING PATELLA      RIGHT    SC UNLISTED PROCEDURE CARDIAC SURGERY      PFO repair         Family History:   Problem Relation Age of Onset    No Known Problems Mother     No Known Problems Father     Cancer Neg Hx         no known history but limited knowledge       Social History     Socioeconomic History    Marital status:      Spouse name: Not on file    Number of children: Not on file    Years of education: Not on file    Highest education level: Not on file   Occupational History    Not on file   Tobacco Use    Smoking status: Former     Packs/day: 0.25     Types: Cigarettes     Quit date:      Years since quittin.0    Smokeless tobacco: Never   Substance and Sexual Activity    Alcohol use: Not Currently    Drug use: No    Sexual activity: Yes   Other Topics Concern    Not on file   Social History Narrative    No known chemical exposures. Social Determinants of Health     Financial Resource Strain: Not on file   Food Insecurity: Not on file   Transportation Needs: Not on file   Physical Activity: Not on file   Stress: Not on file   Social Connections: Not on file   Intimate Partner Violence: Not on file   Housing Stability: Not on file         ALLERGIES: Imitrex [sumatriptan succinate], Imitrex [sumatriptan], and Robaxin [methocarbamol]    Review of Systems   All other systems reviewed and are negative. Vitals:    02/05/23 1929 02/05/23 1934   BP: 115/79    Pulse: (!) 134 (!) 124   Resp: 18    Temp: 98.3 °F (36.8 °C)    SpO2: 100%    Weight: 59 kg (130 lb)    Height: 5' 7\" (1.702 m)             Physical Exam  Vitals and nursing note reviewed. Constitutional:       Appearance: She is well-developed. HENT:      Head: Normocephalic and atraumatic. Eyes:      Conjunctiva/sclera: Conjunctivae normal.   Neck:      Trachea: No tracheal deviation. Cardiovascular:      Rate and Rhythm: Normal rate and regular rhythm. Heart sounds: Normal heart sounds. No murmur heard. No friction rub. No gallop. Pulmonary:      Effort: Pulmonary effort is normal.      Breath sounds: Normal breath sounds. Abdominal:      Palpations: Abdomen is soft. Tenderness: There is abdominal tenderness in the right lower quadrant. Musculoskeletal:         General: No deformity. Cervical back: Neck supple. Skin:     General: Skin is warm and dry. Neurological:      Mental Status: She is alert. Comments: oriented        Medical Decision Making  Amount and/or Complexity of Data Reviewed  Labs: ordered. Radiology: ordered. Risk  Prescription drug management. Procedures    Progress Note:  Results, treatment, and follow up plan have been discussed with patient. Questions were answered.    Alyssia Bryan MD  9:20 PM    Assessment/plan: Chronic right lower quadrant abdominal pain. It has been worse over the past 2 days. Differential diagnosis includes ovarian cyst or torsion, ureteral colic, small bowel obstruction, and others. Reassuring appearance/exam with stable vital signs. CBC, CMP, UA, abdominal CT with no acute process. Home with PCP, GI follow-up. Return precautions.   Magaly Lima MD  9:20 PM

## 2023-02-06 NOTE — ED NOTES
Discharge instructions reviewed with pt. Opportunity for questions provided.  Pt leaves ambulatory with cane

## 2023-02-28 ENCOUNTER — TRANSCRIBE ORDER (OUTPATIENT)
Dept: SCHEDULING | Age: 33
End: 2023-02-28

## 2023-02-28 DIAGNOSIS — R29.898 LEFT LEG WEAKNESS: Primary | ICD-10-CM

## 2023-02-28 DIAGNOSIS — R20.0 NUMBNESS AND TINGLING OF BOTH FEET: ICD-10-CM

## 2023-02-28 DIAGNOSIS — M54.16 LUMBAR RADICULITIS: ICD-10-CM

## 2023-02-28 DIAGNOSIS — R20.2 NUMBNESS AND TINGLING OF BOTH FEET: ICD-10-CM

## 2023-03-05 ENCOUNTER — HOSPITAL ENCOUNTER (OUTPATIENT)
Dept: MRI IMAGING | Age: 33
Discharge: HOME | End: 2023-03-05
Attending: NURSE PRACTITIONER
Payer: COMMERCIAL

## 2023-03-05 VITALS — BODY MASS INDEX: 20.36 KG/M2 | WEIGHT: 130 LBS

## 2023-03-05 DIAGNOSIS — R20.2 NUMBNESS AND TINGLING OF BOTH FEET: ICD-10-CM

## 2023-03-05 DIAGNOSIS — R29.898 LEFT LEG WEAKNESS: ICD-10-CM

## 2023-03-05 DIAGNOSIS — R20.0 NUMBNESS AND TINGLING OF BOTH FEET: ICD-10-CM

## 2023-03-05 DIAGNOSIS — M54.16 LUMBAR RADICULITIS: ICD-10-CM

## 2023-03-05 PROCEDURE — 74011250636 HC RX REV CODE- 250/636: Performed by: RADIOLOGY

## 2023-03-05 PROCEDURE — 72158 MRI LUMBAR SPINE W/O & W/DYE: CPT

## 2023-03-05 PROCEDURE — A9576 INJ PROHANCE MULTIPACK: HCPCS | Performed by: RADIOLOGY

## 2023-03-05 RX ADMIN — GADOTERIDOL 11 ML: 279.3 INJECTION, SOLUTION INTRAVENOUS at 17:10

## 2023-03-22 ENCOUNTER — ANESTHESIA (OUTPATIENT)
Dept: ENDOSCOPY | Age: 33
End: 2023-03-22
Payer: COMMERCIAL

## 2023-03-22 ENCOUNTER — HOSPITAL ENCOUNTER (OUTPATIENT)
Age: 33
Setting detail: OUTPATIENT SURGERY
Discharge: HOME | End: 2023-03-22
Attending: INTERNAL MEDICINE | Admitting: INTERNAL MEDICINE
Payer: COMMERCIAL

## 2023-03-22 ENCOUNTER — ANESTHESIA EVENT (OUTPATIENT)
Dept: ENDOSCOPY | Age: 33
End: 2023-03-22
Payer: COMMERCIAL

## 2023-03-22 VITALS
OXYGEN SATURATION: 100 % | WEIGHT: 135 LBS | HEART RATE: 95 BPM | SYSTOLIC BLOOD PRESSURE: 118 MMHG | HEIGHT: 67 IN | BODY MASS INDEX: 21.19 KG/M2 | TEMPERATURE: 97.8 F | RESPIRATION RATE: 11 BRPM | DIASTOLIC BLOOD PRESSURE: 81 MMHG

## 2023-03-22 LAB — HCG UR QL: NEGATIVE

## 2023-03-22 PROCEDURE — 81025 URINE PREGNANCY TEST: CPT

## 2023-03-22 PROCEDURE — 76040000019: Performed by: INTERNAL MEDICINE

## 2023-03-22 PROCEDURE — 74011250636 HC RX REV CODE- 250/636: Performed by: INTERNAL MEDICINE

## 2023-03-22 PROCEDURE — 77030041758 HC FCPS RSC GRSP BSC -B: Performed by: INTERNAL MEDICINE

## 2023-03-22 PROCEDURE — 74011250636 HC RX REV CODE- 250/636: Performed by: STUDENT IN AN ORGANIZED HEALTH CARE EDUCATION/TRAINING PROGRAM

## 2023-03-22 PROCEDURE — 76060000031 HC ANESTHESIA FIRST 0.5 HR: Performed by: INTERNAL MEDICINE

## 2023-03-22 PROCEDURE — 2709999900 HC NON-CHARGEABLE SUPPLY: Performed by: INTERNAL MEDICINE

## 2023-03-22 PROCEDURE — 74011000250 HC RX REV CODE- 250: Performed by: STUDENT IN AN ORGANIZED HEALTH CARE EDUCATION/TRAINING PROGRAM

## 2023-03-22 RX ORDER — SODIUM CHLORIDE 9 MG/ML
50 INJECTION, SOLUTION INTRAVENOUS CONTINUOUS
Status: DISCONTINUED | OUTPATIENT
Start: 2023-03-22 | End: 2023-03-22 | Stop reason: HOSPADM

## 2023-03-22 RX ORDER — SODIUM CHLORIDE, SODIUM LACTATE, POTASSIUM CHLORIDE, CALCIUM CHLORIDE 600; 310; 30; 20 MG/100ML; MG/100ML; MG/100ML; MG/100ML
INJECTION, SOLUTION INTRAVENOUS
Status: DISCONTINUED | OUTPATIENT
Start: 2023-03-22 | End: 2023-03-22 | Stop reason: HOSPADM

## 2023-03-22 RX ORDER — SODIUM CHLORIDE 0.9 % (FLUSH) 0.9 %
5-40 SYRINGE (ML) INJECTION EVERY 8 HOURS
Status: DISCONTINUED | OUTPATIENT
Start: 2023-03-22 | End: 2023-03-22 | Stop reason: HOSPADM

## 2023-03-22 RX ORDER — LIDOCAINE HYDROCHLORIDE 20 MG/ML
INJECTION, SOLUTION EPIDURAL; INFILTRATION; INTRACAUDAL; PERINEURAL AS NEEDED
Status: DISCONTINUED | OUTPATIENT
Start: 2023-03-22 | End: 2023-03-22 | Stop reason: HOSPADM

## 2023-03-22 RX ORDER — PROPOFOL 10 MG/ML
INJECTION, EMULSION INTRAVENOUS AS NEEDED
Status: DISCONTINUED | OUTPATIENT
Start: 2023-03-22 | End: 2023-03-22 | Stop reason: HOSPADM

## 2023-03-22 RX ORDER — NALOXONE HYDROCHLORIDE 0.4 MG/ML
0.4 INJECTION, SOLUTION INTRAMUSCULAR; INTRAVENOUS; SUBCUTANEOUS
Status: DISCONTINUED | OUTPATIENT
Start: 2023-03-22 | End: 2023-03-22 | Stop reason: HOSPADM

## 2023-03-22 RX ORDER — DEXTROMETHORPHAN/PSEUDOEPHED 2.5-7.5/.8
1.2 DROPS ORAL
Status: DISCONTINUED | OUTPATIENT
Start: 2023-03-22 | End: 2023-03-22 | Stop reason: HOSPADM

## 2023-03-22 RX ORDER — SODIUM CHLORIDE 0.9 % (FLUSH) 0.9 %
5-40 SYRINGE (ML) INJECTION AS NEEDED
Status: DISCONTINUED | OUTPATIENT
Start: 2023-03-22 | End: 2023-03-22 | Stop reason: HOSPADM

## 2023-03-22 RX ORDER — FENTANYL CITRATE 50 UG/ML
25-100 INJECTION, SOLUTION INTRAMUSCULAR; INTRAVENOUS
Status: DISCONTINUED | OUTPATIENT
Start: 2023-03-22 | End: 2023-03-22 | Stop reason: HOSPADM

## 2023-03-22 RX ORDER — EPINEPHRINE 0.1 MG/ML
1 INJECTION INTRACARDIAC; INTRAVENOUS
Status: DISCONTINUED | OUTPATIENT
Start: 2023-03-22 | End: 2023-03-22 | Stop reason: HOSPADM

## 2023-03-22 RX ORDER — ATROPINE SULFATE 0.1 MG/ML
0.5 INJECTION INTRAVENOUS
Status: DISCONTINUED | OUTPATIENT
Start: 2023-03-22 | End: 2023-03-22 | Stop reason: HOSPADM

## 2023-03-22 RX ORDER — FLUMAZENIL 0.1 MG/ML
0.2 INJECTION INTRAVENOUS
Status: DISCONTINUED | OUTPATIENT
Start: 2023-03-22 | End: 2023-03-22 | Stop reason: HOSPADM

## 2023-03-22 RX ADMIN — LIDOCAINE HYDROCHLORIDE 40 MG: 20 INJECTION, SOLUTION EPIDURAL; INFILTRATION; INTRACAUDAL; PERINEURAL at 07:45

## 2023-03-22 RX ADMIN — SODIUM CHLORIDE, POTASSIUM CHLORIDE, SODIUM LACTATE AND CALCIUM CHLORIDE: 600; 310; 30; 20 INJECTION, SOLUTION INTRAVENOUS at 07:45

## 2023-03-22 RX ADMIN — FENTANYL CITRATE 25 MCG: 50 INJECTION, SOLUTION INTRAMUSCULAR; INTRAVENOUS at 08:15

## 2023-03-22 RX ADMIN — PROPOFOL 50 MG: 10 INJECTION, EMULSION INTRAVENOUS at 07:48

## 2023-03-22 RX ADMIN — PROPOFOL 100 MG: 10 INJECTION, EMULSION INTRAVENOUS at 07:45

## 2023-03-22 RX ADMIN — PROPOFOL 50 MG: 10 INJECTION, EMULSION INTRAVENOUS at 07:46

## 2023-03-22 RX ADMIN — PROPOFOL 50 MG: 10 INJECTION, EMULSION INTRAVENOUS at 07:51

## 2023-03-22 NOTE — ANESTHESIA POSTPROCEDURE EVALUATION
Post-Anesthesia Evaluation and Assessment    Patient: Shreya Lopez MRN: [id number]  SSN: [id number]    YOB: 1990  Age: 28 y.o. Sex: female      I have evaluated the patient and they are stable and ready for discharge from the PACU. Cardiovascular Function/Vital Signs  Visit Vitals  /81   Pulse 95   Temp 36.6 °C (97.8 °F)   Resp 11   Ht 5' 7\" (1.702 m)   Wt 61.2 kg (135 lb)   SpO2 100%   Breastfeeding No   BMI 21.14 kg/m²       Patient is status post MAC anesthesia for Procedure(s):  ESOPHAGOGASTRODUODENOSCOPY (EGD)  ENDOSCOPY WITH PROSTHESIS OR STENT REMOVAL. Nausea/Vomiting: None    Postoperative hydration reviewed and adequate. Pain:  Pain Scale 1: Numeric (0 - 10) (03/22/23 0825)  Pain Intensity 1: 0 (03/22/23 0825)   Managed    Neurological Status: At baseline    Mental Status, Level of Consciousness: Alert and  oriented to person, place, and time    Pulmonary Status:   O2 Device: None (Room air) (03/22/23 0825)   Adequate oxygenation and airway patent    Complications related to anesthesia: None    Post-anesthesia assessment completed. No concerns    Signed By: Roney Mcnair MD     March 22, 2023              Procedure(s):  ESOPHAGOGASTRODUODENOSCOPY (EGD)  ENDOSCOPY WITH PROSTHESIS OR STENT REMOVAL.     MAC    <[email or web address]>    INITIAL Post-op Vital signs:   Vitals Value Taken Time   /81 03/22/23 0820   Temp 36.6 °C (97.8 °F) 03/22/23 0757   Pulse 95 03/22/23 0825   Resp 11 03/22/23 0825   SpO2 100 % 03/22/23 0825

## 2023-03-22 NOTE — PERIOP NOTES

## 2023-03-22 NOTE — H&P
118 [facility].  217 [facility] 140 [facility], [address]   [phone]                                History and Physical     NAME: Vicente Krishna   :  1990   MRN:  [id number]     HPI:  The patient was seen and examined. Past Surgical History:   Procedure Laterality Date    HX ORTHOPAEDIC      realignment on left knee    HX WISDOM TEETH EXTRACTION      FL RCNSTJ DISLOCATING PATELLA      RIGHT    FL UNLISTED PROCEDURE CARDIAC SURGERY      PFO repair     Past Medical History:   Diagnosis Date    Adverse effect of anesthesia     Asthma     Heart murmur     PFO (patent foramen ovale)     Raynaud disease     Stroke (HCC)     UTI (lower urinary tract infection)      Social History     Tobacco Use    Smoking status: Former     Packs/day: 0.25     Types: Cigarettes     Quit date:      Years since quittin.2    Smokeless tobacco: Never   Substance Use Topics    Alcohol use: Not Currently    Drug use: No     Allergies   Allergen Reactions    Imitrex [Sumatriptan Succinate] Palpitations    Imitrex [Sumatriptan] Palpitations    Robaxin [Methocarbamol] Vertigo     Family History   Problem Relation Age of Onset    No Known Problems Mother     No Known Problems Father     Cancer Neg Hx         no known history but limited knowledge     No current facility-administered medications for this encounter. PHYSICAL EXAM:  General: WD, WN. Alert, cooperative, no acute distress    HEENT: NC, Atraumatic. PERRLA, EOMI. Anicteric sclerae. Lungs:  CTA Bilaterally. No Wheezing/Rhonchi/Rales. Heart:  Regular  rhythm,  No murmur, No Rubs, No Gallops  Abdomen: Soft, Non distended, Non tender. +Bowel sounds, no HSM  Extremities: No c/c/e  Neurologic:  CN 2-12 gi, Alert and oriented X 3. No acute neurological distress   Psych:   Good insight. Not anxious nor agitated. The heart, lungs and mental status were satisfactory for the administration of MAC sedation and for the procedure. Mallampati score: 2     The patient was counseled at length about the risks of michael Covid-19 in the raji-operative and post-operative states including the recovery window of their procedure. The patient was made aware that michael Covid-19 after a surgical procedure may worsen their prognosis for recovering from the virus and lend to a higher morbidity and or mortality risk. The patient was given the options of postponing their procedure. All of the risks, benefits, and alternatives were discussed. The patient does  wish to proceed with the procedure.       Assessment:   AXIOS stent removal    Plan:   Endoscopic procedure  MAC sedation

## 2023-03-22 NOTE — ANESTHESIA PREPROCEDURE EVALUATION
Relevant Problems   No relevant active problems       Anesthetic History   No history of anesthetic complications            Review of Systems / Medical History  Patient summary reviewed, nursing notes reviewed and pertinent labs reviewed    Pulmonary            Asthma : well controlled       Neuro/Psych       CVA       Cardiovascular                  Exercise tolerance: >4 METS  Comments: pfo s/p closure   GI/Hepatic/Renal               Comments: Pancreatic cyst Endo/Other  Within defined limits           Other Findings              Physical Exam    Airway  Mallampati: II  TM Distance: > 6 cm  Neck ROM: normal range of motion   Mouth opening: Normal     Cardiovascular  Regular rate and rhythm,  S1 and S2 normal,  no murmur, click, rub, or gallop             Dental  No notable dental hx       Pulmonary  Breath sounds clear to auscultation               Abdominal  GI exam deferred       Other Findings            Anesthetic Plan    ASA: 2  Anesthesia type: MAC          Induction: Intravenous  Anesthetic plan and risks discussed with: Patient

## 2023-03-22 NOTE — DISCHARGE INSTRUCTIONS
118 [facility].  [address]  [id number]  1990    DISCOMFORT:  Sore throat- throat lozenges or warm salt water gargle  redness at IV site- apply warm compress to area; if redness or soreness persist- contact your physician  Gaseous discomfort- walking, belching will help relieve any discomfort    DIET  You may eat and drink after you leave. You may resume your regular diet - however -  remember your colon is empty and a heavy meal will produce gas. Avoid these foods:  vegetables, fried / greasy foods, carbonated drinks   You may not drink alcoholic beverages for at least 12 hours    ACTIVITY  You may resume your normal daily activities   Spend the remainder of the day resting -  avoid any strenuous activity. You may not operate a vehicle for 12 hours  You may not engage in an occupation involving machinery or appliances for rest of today  Avoid making any critical decisions for at least 24 hour    CALL M.D. ANY SIGN OF   Increasing pain, nausea, vomiting  Abdominal distension (swelling)  New increased bleeding (oral or rectal)  Fever (chills)  Pain in chest area  Bloody discharge from nose or mouth  Shortness of breath    Follow-up Instructions:   Call Dr. Mónica Shah for any questions or problems. If we took a biopsy please call the office within 2 weeks to discuss your pathology results. Telephone # [phone]       ENDOSCOPY FINDINGS:   1)Food in stomach  2)stent removal       Post-procedure recommendations:   -Follow symptoms. , -Clear liquid diet and advance as tolerated. , -Gastric emptying scan (4 hour study)  -Continue current medications

## 2023-03-22 NOTE — PROCEDURES
[address]  217 [facility] 140 [facility], [address]  [phone]                            NAME:  Eugenia Campbell   :   1990   MRN:   [id number]     Date/Time:  3/22/2023 7:54 AM    Esophagogastroduodenoscopy (EGD) Procedure Note    :  Peng Cabello MD    Staff: Endoscopy Technician-1: Natalya Stephens  Endoscopy RN-1: Patrick Paredes RN     Implants: none    Referring Provider:  Fran Cormier DO    Anethesia/Sedation:  MAC anesthesia    Procedure Details     After infom consent was obtained for the procedure, with all risks and benefits of procedure explained the patient was taken to the endoscopy suite and placed in the left lateral decubitus position. Following sequential administration of sedation as per above, the [id number] gastroscope was inserted into the mouth and advanced under direct vision to stomach. A careful inspection was made as the gastroscope was withdrawn, including a retroflexed view of the proximal stomach; findings and interventions are described below. Findings:  Esophagus:normal  Stomach:Retained solid food in stomach. AXIOS stent was noted in proximal stomach on the lesser curvature. Stomach evaluation was not complete due to presence of solid food. Duodenum/jejunum: not examined      Therapies:  AXIOS stent was removed using rat tooth forceps. No immediate complications    Specimens: none           EBL: None    Complications:   None; patient tolerated the procedure well. Impression:    See Postoperative diagnosis above    Recommendations:  -Follow symptoms. , -Clear liquid diet and advance as tolerated. , -Gastric emptying scan (4 hour study)  -Continue current medications    Discharge disposition:  Home in the company of  when able to ambulate    Peng Cabello MD

## 2024-05-09 ENCOUNTER — HOSPITAL ENCOUNTER (OUTPATIENT)
Facility: HOSPITAL | Age: 34
Discharge: HOME | End: 2024-05-09
Payer: COMMERCIAL

## 2024-05-09 DIAGNOSIS — M53.3 SACROILIAC JOINT DYSFUNCTION: ICD-10-CM

## 2024-05-09 DIAGNOSIS — M51.36 DDD (DEGENERATIVE DISC DISEASE), LUMBAR: ICD-10-CM

## 2024-05-09 DIAGNOSIS — M54.16 LUMBAR RADICULITIS: ICD-10-CM

## 2024-05-09 PROCEDURE — 72148 MRI LUMBAR SPINE W/O DYE: CPT

## 2025-01-09 NOTE — ED PROVIDER NOTES
35yo presents ED for evaluation of abdominal pain. Patient reports the pain is in her right side and has had some associated nausea and vomiting with this. Has had a low-grade fever. Patient had a cystoscogastrotomy placed 3 days ago for pancreatitis. Patient called her surgeon today and was directed to come the emergency department to evaluate for possible migration of the stent. Patient is on Dilaudid at home for pain but does not take anything today. Post OP Complication  Associated symptoms include abdominal pain. Pertinent negatives include no chest pain. Past Medical History:   Diagnosis Date    Adverse effect of anesthesia     Asthma     Heart murmur     PFO (patent foramen ovale)     Raynaud disease     Stroke [facility])     UTI (lower urinary tract infection)        Past Surgical History:   Procedure Laterality Date    HX ORTHOPAEDIC      realignment on left knee    HX WISDOM TEETH EXTRACTION      OK CARDIAC SURG PROCEDURE UNLIST      PFO repair    OK REVISION OF UNSTABLE PATELLA      RIGHT         Family History:   Problem Relation Age of Onset    No Known Problems Mother     No Known Problems Father     Cancer Neg Hx         no known history but limited knowledge       Social History     Socioeconomic History    Marital status:      Spouse name: Not on file    Number of children: Not on file    Years of education: Not on file    Highest education level: Not on file   Occupational History    Not on file   Tobacco Use    Smoking status: Former     Packs/day: 0.25     Types: Cigarettes     Quit date:      Years since quittin.8    Smokeless tobacco: Never   Substance and Sexual Activity    Alcohol use: Not Currently    Drug use: No    Sexual activity: Yes   Other Topics Concern    Not on file   Social History Narrative    No known chemical exposures.      Social Determinants of Health     Financial Resource Strain: Not on file   Food Insecurity: Not on file   Transportation Needs: Not on file   Physical Activity: Not on file   Stress: Not on file   Social Connections: Not on file   Intimate Partner Violence: Not on file   Housing Stability: Not on file         ALLERGIES: Imitrex [sumatriptan succinate], Imitrex [sumatriptan], and Robaxin [methocarbamol]    Review of Systems   Respiratory:  Negative for chest tightness. Cardiovascular:  Negative for chest pain. Gastrointestinal:  Positive for abdominal pain, nausea and vomiting. Genitourinary:  Negative for dysuria. Musculoskeletal:  Negative for back pain. All other systems reviewed and are negative. Vitals:    11/07/22 1206   BP: 115/70   Pulse: (!) 120   Resp: 16   Temp: 97.9 °F (36.6 °C)   SpO2: 99%            Physical Exam  Vitals and nursing note reviewed. Constitutional:       General: She is not in acute distress. Appearance: She is normal weight. HENT:      Nose: Nose normal.      Mouth/Throat:      Mouth: Mucous membranes are moist.   Eyes:      Conjunctiva/sclera: Conjunctivae normal.   Cardiovascular:      Rate and Rhythm: Regular rhythm. Tachycardia present. Pulses: Normal pulses. Heart sounds: Normal heart sounds. Pulmonary:      Effort: Pulmonary effort is normal.      Breath sounds: Normal breath sounds. Abdominal:      General: Abdomen is flat. Bowel sounds are normal.      Tenderness: There is abdominal tenderness. There is no guarding or rebound. Skin:     General: Skin is warm. Capillary Refill: Capillary refill takes less than 2 seconds. Neurological:      General: No focal deficit present. Mental Status: She is alert. MDM  Number of Diagnoses or Management Options  Abdominal pain, generalized  Anemia, unspecified type  Diagnosis management comments: Differential includes but limited to postoperative complication, migration of pancreatic stent. Pancreatitis. Well-appearing 69-year-old female who presented status post 3 days from cystogastrostomy placement.   Patient was tachycardic on arrival pulse 120. Afebrile. Pain likely as a cause of the tachycardia. Lipase is normal, CBC shows mild anemia Hemoglobin 8.5 down from 10.9 on October 22. Otherwise no leukocytosis, urinalysis shows trace leukocyte Estrace. Patient denies any urinary symptoms. CT shows expected appearance of the cyst gastrostomy with decompression of the pancreatic pseudocyst and no peripancreatic hematoma or stranding, patient was offered IV fluids for her tachycardia likely secondary to pain. Patient would like to go home. Have offered her Norco which she is excepted to take prior to discharge. Patient will be discharged with instruction to follow-up with her surgeon as soon as possible. Return precaution discussed agreed from prior discharge      ED Course as of 11/07/22 1451   Mon Nov 07, 2022   1428 EXAM: CT ABD PELV W CONT     INDICATION: Abdominal pain, recent pancreatic stent placement     COMPARISON: October 17, 2022      CONTRAST: 100 mL of Isovue-370. ORAL CONTRAST: None     TECHNIQUE:   Following the uneventful intravenous administration of contrast, thin axial  images were obtained through the abdomen and pelvis. Coronal and sagittal  reconstructions were generated. CT dose reduction was achieved through use of a  standardized protocol tailored for this examination and automatic exposure  control for dose modulation. FINDINGS:   LOWER THORAX: No significant abnormality in the incidentally imaged lower chest.  LIVER: Stable segment 3 hemangioma. BILIARY TREE: Status post cholecystectomy. CBD is not dilated. SPLEEN: within normal limits. PANCREAS: Irregular pancreatic duct dilation. Pancreatic pseudocyst has been  decompressed with a cyst gastrostomy. Air and fluid in the pseudocyst, as  expected. No peripancreatic inflammation or hemorrhage. ADRENALS: Unremarkable. KIDNEYS: No mass, calculus, or hydronephrosis.   STOMACH: Cyst gastrostomy between the stomach and the pancreatic pseudocyst. No  gastric wall thickening. SMALL BOWEL: No dilatation or wall thickening. COLON: No dilatation or wall thickening. APPENDIX: Status post appendectomy. PERITONEUM: No ascites or pneumoperitoneum. RETROPERITONEUM: No lymphadenopathy or aortic aneurysm. REPRODUCTIVE ORGANS: Pedunculated fibroid off the posterior uterine fundus  measuring 2.8 x 2.7 cm. Small volume free fluid in the cul-de-sac, likely  physiologic. URINARY BLADDER: No mass or calculus. BONES: No destructive bone lesion. ABDOMINAL WALL: No mass or hernia. ADDITIONAL COMMENTS: N/A     IMPRESSION  1. Expected appearance of the cyst gastrostomy with decompression of the  pancreatic pseudocyst and no peripancreatic hematoma or stranding.   2.  Fibroid uterus. [WG]      ED Course User Index  [WG] DO Kim Morton 21 yr old m that presents with chest pain. labs, ekg, imaging, reassess. dispo pending. ekg with early repol, no signs of STEMI. pt asymptomatic throughout evaluation. Labs and EKG were ordered and reviewed.  Imaging was ordered and reviewed by me.  Appropriate medications for patient's presenting complaints were ordered and effects were reassessed.  Patient's records (prior hospital, ED visit, and/or nursing home notes if available) were reviewed.  Additional history was obtained from EMS, family, and/or PCP (where available).  Escalation to admission/observation was considered.   However patient feels much better and is comfortable with discharge.  Appropriate follow-up was arranged.    I have discussed the discharge plan with the patient. The patient agrees with the plan, as discussed.  The patient understands Emergency Department diagnosis is a preliminary diagnosis often based on limited information and that the patient must adhere to the follow-up plan as discussed.  The patient understands that if the symptoms worsen or if prescribed medications do not have the desired/planned effect that the patient may return to the Emergency Department at any time for further evaluation and treatment.

## (undated) DEVICE — REM POLYHESIVE ADULT PATIENT RETURN ELECTRODE: Brand: VALLEYLAB

## (undated) DEVICE — Device

## (undated) DEVICE — NEEDLE ASPIR 19GA CHN 2.8MM SHTH DIA1.73MM CO CHROM ENDOSCP

## (undated) DEVICE — TUBING HYDR IRR --

## (undated) DEVICE — RESCUE COMBO FORCEPS

## (undated) DEVICE — SYSTEM ENDOSCP US DEL 22GA W/ FNA NDL BEAC